# Patient Record
Sex: MALE | Race: WHITE | NOT HISPANIC OR LATINO | Employment: FULL TIME | URBAN - METROPOLITAN AREA
[De-identification: names, ages, dates, MRNs, and addresses within clinical notes are randomized per-mention and may not be internally consistent; named-entity substitution may affect disease eponyms.]

---

## 2020-06-27 ENCOUNTER — APPOINTMENT (OUTPATIENT)
Dept: RADIOLOGY | Facility: MEDICAL CENTER | Age: 50
End: 2020-06-27
Attending: EMERGENCY MEDICINE
Payer: COMMERCIAL

## 2020-06-27 ENCOUNTER — HOSPITAL ENCOUNTER (EMERGENCY)
Facility: MEDICAL CENTER | Age: 50
End: 2020-06-28
Attending: EMERGENCY MEDICINE
Payer: COMMERCIAL
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
## 2020-06-27 DIAGNOSIS — S32.020A CLOSED COMPRESSION FRACTURE OF L2 LUMBAR VERTEBRA, INITIAL ENCOUNTER (HCC): ICD-10-CM

## 2020-06-27 DIAGNOSIS — S22.42XA CLOSED FRACTURE OF MULTIPLE RIBS OF LEFT SIDE, INITIAL ENCOUNTER: ICD-10-CM

## 2020-06-27 DIAGNOSIS — F10.920 ALCOHOLIC INTOXICATION WITHOUT COMPLICATION (HCC): ICD-10-CM

## 2020-06-27 PROCEDURE — 70450 CT HEAD/BRAIN W/O DYE: CPT

## 2020-06-27 PROCEDURE — 72125 CT NECK SPINE W/O DYE: CPT

## 2020-06-27 PROCEDURE — 71045 X-RAY EXAM CHEST 1 VIEW: CPT

## 2020-06-27 PROCEDURE — 72131 CT LUMBAR SPINE W/O DYE: CPT

## 2020-06-27 PROCEDURE — 99285 EMERGENCY DEPT VISIT HI MDM: CPT

## 2020-06-27 PROCEDURE — 72128 CT CHEST SPINE W/O DYE: CPT

## 2020-06-27 ASSESSMENT — ENCOUNTER SYMPTOMS
SEIZURES: 0
LOSS OF CONSCIOUSNESS: 1
BLURRED VISION: 0
SORE THROAT: 0
SHORTNESS OF BREATH: 0
COUGH: 0
FOCAL WEAKNESS: 0
ABDOMINAL PAIN: 0
FALLS: 1
NECK PAIN: 0
CHILLS: 0
HEADACHES: 1
FEVER: 0
VOMITING: 0
EYE REDNESS: 0
BACK PAIN: 1

## 2020-06-27 ASSESSMENT — LIFESTYLE VARIABLES: SUBSTANCE_ABUSE: 1

## 2020-06-28 VITALS
BODY MASS INDEX: 28.79 KG/M2 | WEIGHT: 190 LBS | OXYGEN SATURATION: 93 % | TEMPERATURE: 98 F | HEART RATE: 90 BPM | SYSTOLIC BLOOD PRESSURE: 120 MMHG | RESPIRATION RATE: 20 BRPM | DIASTOLIC BLOOD PRESSURE: 73 MMHG | HEIGHT: 68 IN

## 2020-06-28 PROCEDURE — 700111 HCHG RX REV CODE 636 W/ 250 OVERRIDE (IP): Performed by: EMERGENCY MEDICINE

## 2020-06-28 PROCEDURE — 96374 THER/PROPH/DIAG INJ IV PUSH: CPT

## 2020-06-28 PROCEDURE — 700102 HCHG RX REV CODE 250 W/ 637 OVERRIDE(OP): Performed by: EMERGENCY MEDICINE

## 2020-06-28 PROCEDURE — A9270 NON-COVERED ITEM OR SERVICE: HCPCS | Performed by: EMERGENCY MEDICINE

## 2020-06-28 RX ORDER — HYDROCODONE BITARTRATE AND ACETAMINOPHEN 5; 325 MG/1; MG/1
1 TABLET ORAL ONCE
Status: COMPLETED | OUTPATIENT
Start: 2020-06-28 | End: 2020-06-28

## 2020-06-28 RX ORDER — MORPHINE SULFATE 10 MG/ML
6 INJECTION, SOLUTION INTRAMUSCULAR; INTRAVENOUS ONCE
Status: COMPLETED | OUTPATIENT
Start: 2020-06-28 | End: 2020-06-28

## 2020-06-28 RX ORDER — HYDROCODONE BITARTRATE AND ACETAMINOPHEN 5; 325 MG/1; MG/1
1 TABLET ORAL EVERY 4 HOURS PRN
Qty: 15 TAB | Refills: 0 | Status: SHIPPED | OUTPATIENT
Start: 2020-06-28 | End: 2020-07-03

## 2020-06-28 RX ADMIN — HYDROCODONE BITARTRATE AND ACETAMINOPHEN 1 TABLET: 5; 325 TABLET ORAL at 04:40

## 2020-06-28 RX ADMIN — MORPHINE SULFATE 6 MG: 10 INJECTION INTRAVENOUS at 01:20

## 2020-06-28 NOTE — ED NOTES
MD at bedside prior to discharge. Pt given discharge instructions and verbalized understanding with prescription stapled to discharge paperwork, all questions are answered, signed copy in chart. Narcotic consent signed and informed not to drive while on narcotics. PIV removed and dressed. Pt ambulatory to lobby, gait steady, discharged to family. Pt took all belongings from room.

## 2020-06-28 NOTE — ED NOTES
Brace placed on pt without incidence. Pt is educated how to use brace by traction tech. Pt verbalizes understanding.     Pt ambulates to and from bathroom with shuffled, steady gait. He reports the morphine helped a lot, but is still in pain when he moves. Pt gets back into his bed with discomfort.
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
## 2020-11-13 ENCOUNTER — HOSPITAL ENCOUNTER (OUTPATIENT)
Facility: MEDICAL CENTER | Age: 50
End: 2020-11-13
Attending: PHYSICIAN ASSISTANT
Payer: COMMERCIAL

## 2020-11-13 ENCOUNTER — OFFICE VISIT (OUTPATIENT)
Dept: URGENT CARE | Facility: PHYSICIAN GROUP | Age: 50
End: 2020-11-13
Payer: COMMERCIAL

## 2020-11-13 VITALS
OXYGEN SATURATION: 97 % | TEMPERATURE: 99.6 F | SYSTOLIC BLOOD PRESSURE: 122 MMHG | HEIGHT: 68 IN | BODY MASS INDEX: 27.28 KG/M2 | DIASTOLIC BLOOD PRESSURE: 82 MMHG | HEART RATE: 87 BPM | WEIGHT: 180 LBS | RESPIRATION RATE: 18 BRPM

## 2020-11-13 DIAGNOSIS — R50.9 FEVER, UNSPECIFIED FEVER CAUSE: ICD-10-CM

## 2020-11-13 LAB
FLUAV+FLUBV AG SPEC QL IA: NEGATIVE
INT CON NEG: NEGATIVE
INT CON POS: POSITIVE

## 2020-11-13 PROCEDURE — U0003 INFECTIOUS AGENT DETECTION BY NUCLEIC ACID (DNA OR RNA); SEVERE ACUTE RESPIRATORY SYNDROME CORONAVIRUS 2 (SARS-COV-2) (CORONAVIRUS DISEASE [COVID-19]), AMPLIFIED PROBE TECHNIQUE, MAKING USE OF HIGH THROUGHPUT TECHNOLOGIES AS DESCRIBED BY CMS-2020-01-R: HCPCS

## 2020-11-13 PROCEDURE — 99203 OFFICE O/P NEW LOW 30 MIN: CPT | Performed by: PHYSICIAN ASSISTANT

## 2020-11-13 PROCEDURE — 87804 INFLUENZA ASSAY W/OPTIC: CPT | Performed by: PHYSICIAN ASSISTANT

## 2020-11-13 ASSESSMENT — ENCOUNTER SYMPTOMS
PALPITATIONS: 0
COUGH: 1
SHORTNESS OF BREATH: 0
WHEEZING: 0
MYALGIAS: 1
FEVER: 1
HEMOPTYSIS: 0
SPUTUM PRODUCTION: 1
CHILLS: 1
SORE THROAT: 1

## 2020-11-13 NOTE — PROGRESS NOTES
"Subjective:   Thomas Drake is a 50 y.o. male who presents for Cough (fever 102.1 and cough started yesterday )      Cough  This is a new problem. The current episode started in the past 7 days. The problem has been unchanged. The problem occurs constantly. The cough is non-productive. Associated symptoms include chills, a fever, myalgias and a sore throat. Pertinent negatives include no chest pain, ear pain, hemoptysis, shortness of breath or wheezing. The treatment provided no relief.       Review of Systems   Constitutional: Positive for chills, fever and malaise/fatigue.   HENT: Positive for congestion and sore throat. Negative for ear pain.    Respiratory: Positive for cough and sputum production. Negative for hemoptysis, shortness of breath and wheezing.    Cardiovascular: Negative for chest pain and palpitations.   Musculoskeletal: Positive for myalgias.   All other systems reviewed and are negative.      Medications:    • This patient does not have an active medication from one of the medication groupers.    Allergies: Amoxicillin    Problem List: Thomas Drake does not have a problem list on file.    Surgical History:  No past surgical history on file.    Past Social Hx: Thomas Drake  reports that he has never smoked. His smokeless tobacco use includes chew. He reports current alcohol use. He reports current drug use. Drug: Inhaled.     Past Family Hx:  Thomas Drake family history is not on file.     Problem list, medications, and allergies reviewed by myself today in Epic.     Objective:     Blood Pressure 122/82   Pulse 87   Temperature 37.6 °C (99.6 °F) (Temporal)   Respiration 18   Height 1.727 m (5' 8\")   Weight 81.6 kg (180 lb)   Oxygen Saturation 97%   Body Mass Index 27.37 kg/m²     Physical Exam  Vitals signs reviewed.   Constitutional:       General: He is not in acute distress.     Appearance: He is well-developed. He is not ill-appearing or toxic-appearing.      Interventions: He is not " intubated.  HENT:      Head: Normocephalic and atraumatic.      Right Ear: Hearing, tympanic membrane, ear canal and external ear normal.      Left Ear: Hearing, tympanic membrane, ear canal and external ear normal.      Nose: Nose normal.      Mouth/Throat:      Pharynx: Uvula midline.   Eyes:      General: Lids are normal.      Conjunctiva/sclera: Conjunctivae normal.   Neck:      Musculoskeletal: Full passive range of motion without pain, normal range of motion and neck supple.   Cardiovascular:      Rate and Rhythm: Regular rhythm.      Heart sounds: Normal heart sounds, S1 normal and S2 normal. No murmur. No friction rub. No gallop.    Pulmonary:      Effort: Pulmonary effort is normal. No tachypnea, bradypnea, accessory muscle usage or respiratory distress. He is not intubated.      Breath sounds: Normal breath sounds. No decreased breath sounds, wheezing, rhonchi or rales.   Chest:      Chest wall: No tenderness.   Musculoskeletal: Normal range of motion.   Skin:     General: Skin is warm and dry.   Neurological:      Mental Status: He is alert and oriented to person, place, and time.   Psychiatric:         Speech: Speech normal.         Behavior: Behavior normal.         Thought Content: Thought content normal.         Judgment: Judgment normal.         Assessment/Plan:     Medical Decision Making/Comments     Pt is a 50 yr old male who presents for evaluation of possible Covid-19 infection.  Pt states possible exposure. Complains of fever, fatigue and bodyaches.  Vitals and exam unremarkable.     Diagnosis and associated orders     1. Fever, unspecified fever cause  COVID/SARS COV-2 PCR    POCT Influenza A/B     - isolate for 24-72 hrs while pcr test is pending  - treat symptoms with OTC medications           Differential diagnosis, natural history, supportive care, and indications for immediate follow-up discussed.    Advised the patient to follow-up with the primary care physician for recheck,  reevaluation, and consideration of further management.    Please note that this dictation was created using voice recognition software. I have made a reasonable attempt to correct obvious errors, but I expect that there are errors of grammar and possibly content that I did not discover before finalizing the note.

## 2020-11-14 DIAGNOSIS — R50.9 FEVER, UNSPECIFIED FEVER CAUSE: ICD-10-CM

## 2020-11-14 LAB
COVID ORDER STATUS COVID19: NORMAL
SARS-COV-2 RNA RESP QL NAA+PROBE: DETECTED
SPECIMEN SOURCE: ABNORMAL

## 2020-11-15 ENCOUNTER — TELEPHONE (OUTPATIENT)
Dept: URGENT CARE | Facility: PHYSICIAN GROUP | Age: 50
End: 2020-11-15

## 2021-08-24 ENCOUNTER — TELEPHONE (OUTPATIENT)
Dept: SCHEDULING | Facility: IMAGING CENTER | Age: 51
End: 2021-08-24

## 2021-08-27 ENCOUNTER — HOSPITAL ENCOUNTER (OUTPATIENT)
Dept: LAB | Facility: MEDICAL CENTER | Age: 51
End: 2021-08-27
Attending: NURSE PRACTITIONER
Payer: COMMERCIAL

## 2021-08-27 ENCOUNTER — TELEPHONE (OUTPATIENT)
Dept: MEDICAL GROUP | Facility: PHYSICIAN GROUP | Age: 51
End: 2021-08-27

## 2021-08-27 ENCOUNTER — PATIENT MESSAGE (OUTPATIENT)
Dept: MEDICAL GROUP | Facility: PHYSICIAN GROUP | Age: 51
End: 2021-08-27

## 2021-08-27 ENCOUNTER — OFFICE VISIT (OUTPATIENT)
Dept: MEDICAL GROUP | Facility: PHYSICIAN GROUP | Age: 51
End: 2021-08-27

## 2021-08-27 VITALS
WEIGHT: 166 LBS | HEIGHT: 68 IN | DIASTOLIC BLOOD PRESSURE: 86 MMHG | HEART RATE: 71 BPM | TEMPERATURE: 99 F | SYSTOLIC BLOOD PRESSURE: 124 MMHG | BODY MASS INDEX: 25.16 KG/M2 | RESPIRATION RATE: 12 BRPM | OXYGEN SATURATION: 97 %

## 2021-08-27 DIAGNOSIS — E11.69 DM TYPE 2 WITH DIABETIC MIXED HYPERLIPIDEMIA (HCC): ICD-10-CM

## 2021-08-27 DIAGNOSIS — Z76.89 ENCOUNTER TO ESTABLISH CARE: ICD-10-CM

## 2021-08-27 DIAGNOSIS — E78.2 DM TYPE 2 WITH DIABETIC MIXED HYPERLIPIDEMIA (HCC): ICD-10-CM

## 2021-08-27 DIAGNOSIS — J30.2 SEASONAL ALLERGIES: ICD-10-CM

## 2021-08-27 DIAGNOSIS — Z72.0 TOBACCO USE: ICD-10-CM

## 2021-08-27 DIAGNOSIS — E78.2 MIXED HYPERLIPIDEMIA: ICD-10-CM

## 2021-08-27 LAB
25(OH)D3 SERPL-MCNC: 28 NG/ML (ref 30–100)
ALBUMIN SERPL BCP-MCNC: 4.5 G/DL (ref 3.2–4.9)
ALBUMIN/GLOB SERPL: 1.9 G/DL
ALP SERPL-CCNC: 119 U/L (ref 30–99)
ALT SERPL-CCNC: 26 U/L (ref 2–50)
ANION GAP SERPL CALC-SCNC: 10 MMOL/L (ref 7–16)
AST SERPL-CCNC: 13 U/L (ref 12–45)
BILIRUB SERPL-MCNC: 0.3 MG/DL (ref 0.1–1.5)
BUN SERPL-MCNC: 15 MG/DL (ref 8–22)
CALCIUM SERPL-MCNC: 9.7 MG/DL (ref 8.5–10.5)
CHLORIDE SERPL-SCNC: 100 MMOL/L (ref 96–112)
CHOLEST SERPL-MCNC: 185 MG/DL (ref 100–199)
CO2 SERPL-SCNC: 27 MMOL/L (ref 20–33)
CREAT SERPL-MCNC: 0.71 MG/DL (ref 0.5–1.4)
CREAT UR-MCNC: 41.84 MG/DL
ERYTHROCYTE [DISTWIDTH] IN BLOOD BY AUTOMATED COUNT: 35.4 FL (ref 35.9–50)
EST. AVERAGE GLUCOSE BLD GHB EST-MCNC: 321 MG/DL
FASTING STATUS PATIENT QL REPORTED: NORMAL
GLOBULIN SER CALC-MCNC: 2.4 G/DL (ref 1.9–3.5)
GLUCOSE SERPL-MCNC: 325 MG/DL (ref 65–99)
HBA1C MFR BLD: 12.8 % (ref 4–5.6)
HCT VFR BLD AUTO: 48.4 % (ref 42–52)
HDLC SERPL-MCNC: 47 MG/DL
HGB BLD-MCNC: 16.4 G/DL (ref 14–18)
LDLC SERPL CALC-MCNC: 108 MG/DL
MCH RBC QN AUTO: 27.2 PG (ref 27–33)
MCHC RBC AUTO-ENTMCNC: 33.9 G/DL (ref 33.7–35.3)
MCV RBC AUTO: 80.1 FL (ref 81.4–97.8)
MICROALBUMIN UR-MCNC: <1.2 MG/DL
MICROALBUMIN/CREAT UR: NORMAL MG/G (ref 0–30)
PLATELET # BLD AUTO: 321 K/UL (ref 164–446)
PMV BLD AUTO: 9.1 FL (ref 9–12.9)
POTASSIUM SERPL-SCNC: 4.3 MMOL/L (ref 3.6–5.5)
PROT SERPL-MCNC: 6.9 G/DL (ref 6–8.2)
RBC # BLD AUTO: 6.04 M/UL (ref 4.7–6.1)
SODIUM SERPL-SCNC: 137 MMOL/L (ref 135–145)
TRIGL SERPL-MCNC: 152 MG/DL (ref 0–149)
WBC # BLD AUTO: 7.6 K/UL (ref 4.8–10.8)

## 2021-08-27 PROCEDURE — 80053 COMPREHEN METABOLIC PANEL: CPT

## 2021-08-27 PROCEDURE — 80061 LIPID PANEL: CPT

## 2021-08-27 PROCEDURE — 82306 VITAMIN D 25 HYDROXY: CPT

## 2021-08-27 PROCEDURE — 85027 COMPLETE CBC AUTOMATED: CPT

## 2021-08-27 PROCEDURE — 36415 COLL VENOUS BLD VENIPUNCTURE: CPT

## 2021-08-27 PROCEDURE — 82570 ASSAY OF URINE CREATININE: CPT

## 2021-08-27 PROCEDURE — 82043 UR ALBUMIN QUANTITATIVE: CPT

## 2021-08-27 PROCEDURE — 99214 OFFICE O/P EST MOD 30 MIN: CPT | Performed by: NURSE PRACTITIONER

## 2021-08-27 PROCEDURE — 83036 HEMOGLOBIN GLYCOSYLATED A1C: CPT

## 2021-08-27 RX ORDER — MONTELUKAST SODIUM 10 MG/1
10 TABLET ORAL DAILY
Qty: 90 TABLET | Refills: 1 | Status: SHIPPED | OUTPATIENT
Start: 2021-08-27

## 2021-08-27 RX ORDER — ALBUTEROL SULFATE 90 UG/1
2 AEROSOL, METERED RESPIRATORY (INHALATION) EVERY 4 HOURS PRN
Qty: 6.7 EACH | Refills: 2 | Status: SHIPPED | OUTPATIENT
Start: 2021-08-27

## 2021-08-27 RX ORDER — LANCETS 30 GAUGE
EACH MISCELLANEOUS
Qty: 100 EACH | Refills: 3 | Status: SHIPPED | OUTPATIENT
Start: 2021-08-27

## 2021-08-27 RX ORDER — GLUCOSAMINE HCL/CHONDROITIN SU 500-400 MG
CAPSULE ORAL
Qty: 100 EACH | Refills: 3 | Status: SHIPPED | OUTPATIENT
Start: 2021-08-27

## 2021-08-27 RX ORDER — FLUTICASONE PROPIONATE 50 MCG
1 SPRAY, SUSPENSION (ML) NASAL DAILY
COMMUNITY

## 2021-08-27 RX ORDER — INSULIN GLARGINE 100 [IU]/ML
15 INJECTION, SOLUTION SUBCUTANEOUS EVERY EVENING
Qty: 5 ML | Refills: 2 | Status: SHIPPED | OUTPATIENT
Start: 2021-08-27 | End: 2021-09-07

## 2021-08-27 ASSESSMENT — PATIENT HEALTH QUESTIONNAIRE - PHQ9: CLINICAL INTERPRETATION OF PHQ2 SCORE: 0

## 2021-08-27 NOTE — ASSESSMENT & PLAN NOTE
This is a chronic condition, not controlled.  He has tried multiple OTC antihistamines without relief.  He does continue use fluticasone spray with claritin currently with minimal relief of his allergies.  He does endorse headaches, scratchy throat, nasal congestion/discharge, itchy/watery eyes.  He does endorse allergies to cat dander.   > Will trial montelukast.

## 2021-08-27 NOTE — TELEPHONE ENCOUNTER
----- Message from CHRISTOPHER Stein sent at 8/27/2021  2:57 PM PDT -----  Hi,     Please call patient and let him know his A1c is very high at 12.8% with fasting glucose at 325.  His triglyceride levels are high as well due to his uncontrolled diabetes.  I have sent to his pharmacy metformin, jardiance, and lantus.  He will take lantus 15 units nightly; metformin twice a day; and jardiance once a day.  His vitamin D level is also a little low - he can take 2000 units of vitamin D supplement daily.      Please schedule him an appointment in 4 weeks for medication tolerance.      Thank you!  Mickie

## 2021-08-27 NOTE — ASSESSMENT & PLAN NOTE
This is a chronic condition, not controlled.  He was previously on treatment, including metformin 2000 mg BID, glyburide 2.5 mg BID, and insulin lantus.  He is due for updated labs.  He denies polyuria, polydipsia, weight loss, numbness or tingling, and blurred vision.    > Labs returned from today:  A1c 12.8% with fasting glucose of 325.  Lipid profile shows elevations in triglyceride levels.    > Start metformin, jardiance, and long acting insulin.  Continue to monitor blood sugars at home, and he will need to bring in his sugar log at time of visit.

## 2021-08-27 NOTE — PROGRESS NOTES
Subjective  Chief Complaint  Establish care to manage his chronic conditions    History of Present Illness  Thomas Drake is a 50 y.o. male. This patient is here today to establish care.  His prior PCP was Maicol Cid.  Patient Active Problem List    Diagnosis Date Noted   • Seasonal allergies 08/27/2021   • Tobacco use 08/27/2021   • DM type 2 with diabetic mixed hyperlipidemia (HCC) 01/28/2010     Past Medical History    Allergies: Amoxicillin, Paroxetine, and Atorvastatin calcium  Past Medical History:   Diagnosis Date   • Asthma    • Diabetes (HCC)     type 2   • Essential hypertension, benign 9/22/2008   • Extrinsic asthma 9/30/2008   • Hyperlipidemia    • Hypertension      Past Surgical History:   Procedure Laterality Date   • OTHER ORTHOPEDIC SURGERY      L4-L5 lumbar discectomy   • OTHER ORTHOPEDIC SURGERY Left     foot repair without hardware placement     Current Outpatient Medications Ordered in Epic   Medication Sig Dispense Refill   • fluticasone (FLONASE) 50 MCG/ACT nasal spray Administer 1 Spray into affected nostril(S) every day.     • Loratadine (CLARITIN PO) Take  by mouth.     • montelukast (SINGULAIR) 10 MG Tab Take 1 Tablet by mouth every day. 90 Tablet 1   • albuterol 108 (90 Base) MCG/ACT Aero Soln inhalation aerosol Inhale 2 Puffs every four hours as needed for Shortness of Breath. 6.7 Each 2   • metformin (GLUCOPHAGE) 1000 MG tablet Take 1 Tablet by mouth 2 times a day with meals. 180 Tablet 0   • Empagliflozin 25 MG Tab Take 25 mg by mouth every day. 90 Tablet 0   • insulin glargine (INSULIN GLARGINE) 100 UNIT/ML Solution Pen-injector injection Inject 15 Units under the skin every evening. 5 mL 2     No current Epic-ordered facility-administered medications on file.     Family History:    Family History   Problem Relation Age of Onset   • Diabetes Father         type 1   • Hypertension Father    • Hyperlipidemia Father    • Diabetes Brother         type 1   • Hypertension Brother    •  Hyperlipidemia Brother    • Diabetes Paternal Grandmother         type 1   • Heart Disease Paternal Grandmother    • Hypertension Paternal Grandmother    • Hyperlipidemia Paternal Grandmother    • Cancer Neg Hx    • Stroke Neg Hx       Personal/Social History:    Social History     Tobacco Use   • Smoking status: Former Smoker     Packs/day: 1.00     Years: 30.00     Pack years: 30.00     Types: Cigarettes     Quit date: 8/27/2011     Years since quitting: 10.0   • Smokeless tobacco: Former User     Types: Chew     Quit date: 8/13/2021   • Tobacco comment: 8 years without nicotine and resumed 1/2020   Vaping Use   • Vaping Use: Some days   • Substances: Nicotine   Substance Use Topics   • Alcohol use: Yes     Comment: occ   • Drug use: Yes     Frequency: 7.0 times per week     Types: Inhaled     Comment: marijuana     Social History     Social History Narrative   • Not on file      Current Outpatient Medications   Medication Sig Dispense Refill   • fluticasone (FLONASE) 50 MCG/ACT nasal spray Administer 1 Spray into affected nostril(S) every day.     • Loratadine (CLARITIN PO) Take  by mouth.     • montelukast (SINGULAIR) 10 MG Tab Take 1 Tablet by mouth every day. 90 Tablet 1   • albuterol 108 (90 Base) MCG/ACT Aero Soln inhalation aerosol Inhale 2 Puffs every four hours as needed for Shortness of Breath. 6.7 Each 2   • metformin (GLUCOPHAGE) 1000 MG tablet Take 1 Tablet by mouth 2 times a day with meals. 180 Tablet 0   • Empagliflozin 25 MG Tab Take 25 mg by mouth every day. 90 Tablet 0   • insulin glargine (INSULIN GLARGINE) 100 UNIT/ML Solution Pen-injector injection Inject 15 Units under the skin every evening. 5 mL 2     No current facility-administered medications for this visit.     Review of Systems  General: Negative for fever/chills   Eyes:  Negative for vision changes.  ENT:  Negative for hearing changes.   Respiratory:  Negative for cough and dyspnea.    Cardiovascular:  Negative for chest pain and  "palpitations.  Gastrointestinal:  Negative for nausea/vomiting.   Genitourinary:  Negative for dysuria.   Musculoskeletal:  Negative for myalgias.   Skin:  Negative for rash.   Neurological:  Negative for numbness/tingling.   Heme/Lymph:  Does not bruise/bleed easily.    Objective  Physical Exam  /86 (BP Location: Right arm, Patient Position: Sitting, BP Cuff Size: Adult)   Pulse 71   Temp 37.2 °C (99 °F) (Temporal)   Resp 12   Ht 1.727 m (5' 8\")   Wt 75.3 kg (166 lb)   SpO2 97%  Body mass index is 25.24 kg/m².  General:  Alert and oriented.  Well appearing.  NAD.  Head:  Normocephalic.   Eyes:  Eyes conjunctiva clear lids without ptosis.    ENT: Ears normal shape and contour.   Neck: Supple without JVD.  Pulmonary:  Normal effort.  Clear to ausculation without rales, ronchi, or wheezing.  Cardiovascular:  Regular rate and rhythm without murmur, rubs or gallop.  Radial pulses are intact and equal bilaterally.  Gastrointestinal: Abdomen soft, nontender, nondistended. Normal bowel sounds. Liver and spleen are not palpable.  Musculoskeletal:  No extremity cyanosis, clubbing, or edema.  Skin:  Warm and dry.  No obvious lesions.  Neurologic: Grossly intact.  Sensation intact.   Psych: Normal mood and affect. Alert and oriented x3. Judgment and insight is normal.    Assessment/Plan   50 y.o. male presenting with the following.     1. Encounter to establish care     2. Seasonal allergies  montelukast (SINGULAIR) 10 MG Tab    albuterol 108 (90 Base) MCG/ACT Aero Soln inhalation aerosol    VITAMIN D,25 HYDROXY    CBC WITHOUT DIFFERENTIAL   3. DM type 2 with diabetic mixed hyperlipidemia (HCC)  HEMOGLOBIN A1C    Comp Metabolic Panel    Lipid Profile    CBC WITHOUT DIFFERENTIAL    MICROALBUMIN, URINE    metformin (GLUCOPHAGE) 1000 MG tablet    Empagliflozin 25 MG Tab    insulin glargine (INSULIN GLARGINE) 100 UNIT/ML Solution Pen-injector injection   4. Tobacco use  Comp Metabolic Panel    Lipid Profile    CBC " WITHOUT DIFFERENTIAL     Seasonal allergies  This is a chronic condition, not controlled.  He has tried multiple OTC antihistamines without relief.  He does continue use fluticasone spray with claritin currently with minimal relief of his allergies.  He does endorse headaches, scratchy throat, nasal congestion/discharge, itchy/watery eyes.  He does endorse allergies to cat dander.   > Will trial montelukast.     DM type 2 with diabetic mixed hyperlipidemia (HCC)  This is a chronic condition, stable.  He was previously on treatment, including metformin 2000 mg BID, glyburide 2.5 mg BID, and insulin lantus.  He is due for updated labs.  He denies polyuria, polydipsia, weight loss, numbness or tingling, and blurred vision.    > Labs returned from today:  A1c 12.8% with fasting glucose of 325.  Lipid profile shows elevations in triglyceride levels.    > Start metformin, jardiance, and long acting insulin.  Continue to monitor blood sugars at home, and he will need to bring in his sugar log at time of visit.      Tobacco use  This is a chronic condition, not controlled.  He reports he quit nicotine for 8 years; however, resumed nicotine use when he moved to [place] 1/2020.  He reports that he is no longer smoking cigarettes, but he is using vape pens with nicotine.  Previously smoked 1-4 packs of cigarettes for 30 years; 1 can of chewing tobacco daily.   > Smoking cessation counseling provided.     Return in about 6 months (around 2/27/2022), or if symptoms worsen or fail to improve, for allergies.    Health Maintenance: Completed    I have placed the below orders and discussed them with an approved delegating provider.  The MA is performing the below orders under the direction of Dr. Jacobs.    Please note that this dictation was created using voice recognition software. I have worked with consultants from the vendor as well as technical experts from [facility] to optimize the interface. I have made every reasonable  attempt to correct obvious errors, but I expect that there are errors of grammar and possibly content that I did not discover before finalizing the note.    KATJA Stein  [facility]

## 2021-09-07 RX ORDER — INSULIN GLARGINE 100 [IU]/ML
15 INJECTION, SOLUTION SUBCUTANEOUS EVERY EVENING
Qty: 5 ML | Refills: 2 | Status: SHIPPED | OUTPATIENT
Start: 2021-09-07 | End: 2021-09-30 | Stop reason: SDUPTHER

## 2021-09-30 DIAGNOSIS — E78.2 DM TYPE 2 WITH DIABETIC MIXED HYPERLIPIDEMIA (HCC): ICD-10-CM

## 2021-09-30 DIAGNOSIS — E11.69 DM TYPE 2 WITH DIABETIC MIXED HYPERLIPIDEMIA (HCC): ICD-10-CM

## 2021-09-30 RX ORDER — INSULIN GLARGINE 100 [IU]/ML
15 INJECTION, SOLUTION SUBCUTANEOUS EVERY EVENING
Qty: 5 ML | Refills: 2 | Status: SHIPPED | OUTPATIENT
Start: 2021-09-30

## 2021-10-26 ENCOUNTER — OFFICE VISIT (OUTPATIENT)
Dept: MEDICAL GROUP | Facility: PHYSICIAN GROUP | Age: 51
End: 2021-10-26
Payer: COMMERCIAL

## 2021-10-26 VITALS
BODY MASS INDEX: 26.22 KG/M2 | WEIGHT: 173 LBS | TEMPERATURE: 99 F | OXYGEN SATURATION: 97 % | DIASTOLIC BLOOD PRESSURE: 88 MMHG | HEART RATE: 89 BPM | RESPIRATION RATE: 12 BRPM | SYSTOLIC BLOOD PRESSURE: 142 MMHG | HEIGHT: 68 IN

## 2021-10-26 DIAGNOSIS — E11.69 DM TYPE 2 WITH DIABETIC MIXED HYPERLIPIDEMIA (HCC): ICD-10-CM

## 2021-10-26 DIAGNOSIS — Z72.0 TOBACCO USE: ICD-10-CM

## 2021-10-26 DIAGNOSIS — E78.2 DM TYPE 2 WITH DIABETIC MIXED HYPERLIPIDEMIA (HCC): ICD-10-CM

## 2021-10-26 PROCEDURE — 99214 OFFICE O/P EST MOD 30 MIN: CPT | Performed by: NURSE PRACTITIONER

## 2021-10-26 RX ORDER — BLOOD SUGAR DIAGNOSTIC
STRIP MISCELLANEOUS
COMMUNITY
Start: 2021-09-30

## 2021-10-26 ASSESSMENT — FIBROSIS 4 INDEX: FIB4 SCORE: 0.39

## 2021-10-26 NOTE — PROGRESS NOTES
Subjective  Chief Complaint  Chief Complaint   Patient presents with   • Follow-Up     History of Present Illness  Thomas presents today with the following.  Problem   Tobacco Use   DM Type 2 With Diabetic Mixed Hyperlipidemia (Hcc)     Past Medical History    Allergies: Amoxicillin, Paroxetine, and Atorvastatin calcium  Past Medical History:   Diagnosis Date   • Asthma    • Diabetes (HCC)     type 2   • Essential hypertension, benign 9/22/2008   • Extrinsic asthma 9/30/2008   • Hyperlipidemia    • Hypertension      Current Outpatient Medications Ordered in Epic   Medication Sig Dispense Refill   • ONETOUCH ULTRA strip      • Empagliflozin 25 MG Tab Take 25 mg by mouth every day. 90 Tablet 1   • insulin glargine (LANTUS SOLOSTAR) 100 UNIT/ML Solution Pen-injector injection Inject 15 Units under the skin every evening. 5 mL 2   • fluticasone (FLONASE) 50 MCG/ACT nasal spray Administer 1 Spray into affected nostril(S) every day.     • Loratadine (CLARITIN PO) Take  by mouth.     • montelukast (SINGULAIR) 10 MG Tab Take 1 Tablet by mouth every day. 90 Tablet 1   • albuterol 108 (90 Base) MCG/ACT Aero Soln inhalation aerosol Inhale 2 Puffs every four hours as needed for Shortness of Breath. 6.7 Each 2   • metformin (GLUCOPHAGE) 1000 MG tablet Take 1 Tablet by mouth 2 times a day with meals. 180 Tablet 0   • Blood Glucose Meter Kit Test blood sugar as recommended by provider. Insurance preferred blood glucose monitoring kit. 1 Kit 0   • Blood Glucose Test Strips Use one insurance preferred strip to test blood sugar three times daily before meals. 100 Strip 3   • Lancets Use one insurance preferred lancet to test blood sugar three times daily before meals. 100 Each 3   • Alcohol Swabs Wipe site with prep pad prior to injection. 100 Each 3   • Insulin Pen Needle 32 G x 4 mm Use one pen needle in pen device to inject insulin once daily. 100 Each 3     No current Epic-ordered facility-administered medications on file.  "    Review of Systems  See below.     Objective  Physical Exam  /88 (BP Location: Left arm, Patient Position: Sitting, BP Cuff Size: Adult)   Pulse 89   Temp 37.2 °C (99 °F) (Temporal)   Resp 12   Ht 1.727 m (5' 8\")   Wt 78.5 kg (173 lb)   SpO2 97%  Body mass index is 26.3 kg/m².  General:  Alert and oriented.  Well appearing.  NAD  Neck: Supple without JVD. No lymphadenopathy.  Pulmonary:  Normal effort.  Clear to ausculation without rales, ronchi, or wheezing.  Cardiovascular:  Regular rate and rhythm without murmur, rubs or gallop.   Skin:  Warm and dry.  No obvious lesions.  Diabetic foot exam: No lesions or calluses noted. 2+ pedal pulses. Sensation intact with 10 out of 10 on monofilament test.  Musculoskeletal:  No extremity cyanosis, clubbing, or edema.    Assessment/Plan  51 y.o. male with the following issues.    1. DM type 2 with diabetic mixed hyperlipidemia (HCC)  HEMOGLOBIN A1C    Basic Metabolic Panel    Empagliflozin 25 MG Tab    Diabetic Monofilament LE Exam    REFERRAL FOR RETINAL SCREENING EXAM   2. Tobacco use        Tobacco use  This is a chronic condition, not controlled.  He has stopped chewing tobacco since August 2021.  He has weaned from 18 mg nicotine to 3 mg of nicotine.  > Congratulated on continuing limiting nicotine use.  Smoking cessation counseling provided.     DM type 2 with diabetic mixed hyperlipidemia (HCC)  This is a chronic condition, not controlled.  Current medication:  lantus 15 units every evening, metformin 1000 mg BID with meals, jardiance 25 mg daily.  He reports tolerance to medications since resuming after our last visit in August.  He reports that home sugars dropped less than 200s; ranges from 160-170s.  He does endorse at times he does eat sugar foods, and notices his sugars jump into the 300s.  He denies any polyuria, polydipsia, weight loss, numbers/tingling, or blurred vision.    > Continue current treatment plan.  Recheck A1c and fasting glucose in " 4 weeks.  Reinforced importance of diet and lifestyle measures.      Return in about 4 months (around 3/3/2022), or if symptoms worsen or fail to improve, for already scheduled appointment.    Health Maintenance: Completed    I have placed the below orders and discussed them with an approved delegating provider.  The MA is performing the below orders under the direction of Dr. Jacobs.    Please note that this dictation was created using voice recognition software. I have worked with consultants from the vendor as well as technical experts from [facility] to optimize the interface. I have made every reasonable attempt to correct obvious errors, but I expect that there are errors of grammar and possibly content that I did not discover before finalizing the note.    KATJA Stein  [facility]

## 2021-10-26 NOTE — ASSESSMENT & PLAN NOTE
This is a chronic condition, not controlled.  He has stopped chewing tobacco since August 2021.  He has weaned from 18 mg nicotine to 3 mg of nicotine.  > Congratulated on continuing limiting nicotine use.  Smoking cessation counseling provided.

## 2021-10-26 NOTE — ASSESSMENT & PLAN NOTE
This is a chronic condition, not controlled.  Current medication:  lantus 15 units every evening, metformin 1000 mg BID with meals, jardiance 25 mg daily.  He reports tolerance to medications since resuming after our last visit in August.  He reports that home sugars dropped less than 200s; ranges from 160-170s.  He does endorse at times he does eat sugar foods, and notices his sugars jump into the 300s.  He denies any polyuria, polydipsia, weight loss, numbers/tingling, or blurred vision.    > Continue current treatment plan.  Recheck A1c and fasting glucose in 4 weeks.  Reinforced importance of diet and lifestyle measures.

## 2022-01-14 ENCOUNTER — HOSPITAL ENCOUNTER (OUTPATIENT)
Facility: MEDICAL CENTER | Age: 52
End: 2022-01-14
Attending: NURSE PRACTITIONER

## 2022-01-14 ENCOUNTER — OFFICE VISIT (OUTPATIENT)
Dept: URGENT CARE | Facility: PHYSICIAN GROUP | Age: 52
End: 2022-01-14

## 2022-01-14 VITALS
HEART RATE: 114 BPM | OXYGEN SATURATION: 95 % | TEMPERATURE: 99.7 F | SYSTOLIC BLOOD PRESSURE: 102 MMHG | DIASTOLIC BLOOD PRESSURE: 60 MMHG | BODY MASS INDEX: 25.76 KG/M2 | HEIGHT: 68 IN | RESPIRATION RATE: 16 BRPM | WEIGHT: 170 LBS

## 2022-01-14 DIAGNOSIS — R48.1 LOSS OF PERCEPTION FOR TASTE: ICD-10-CM

## 2022-01-14 DIAGNOSIS — Z86.39 H/O DIABETES MELLITUS: ICD-10-CM

## 2022-01-14 DIAGNOSIS — R53.83 OTHER FATIGUE: ICD-10-CM

## 2022-01-14 DIAGNOSIS — R11.2 NON-INTRACTABLE VOMITING WITH NAUSEA, UNSPECIFIED VOMITING TYPE: ICD-10-CM

## 2022-01-14 LAB
COVID ORDER STATUS COVID19: NORMAL
GLUCOSE BLD-MCNC: 319 MG/DL (ref 70–100)

## 2022-01-14 PROCEDURE — 99213 OFFICE O/P EST LOW 20 MIN: CPT | Performed by: NURSE PRACTITIONER

## 2022-01-14 PROCEDURE — U0005 INFEC AGEN DETEC AMPLI PROBE: HCPCS

## 2022-01-14 PROCEDURE — U0003 INFECTIOUS AGENT DETECTION BY NUCLEIC ACID (DNA OR RNA); SEVERE ACUTE RESPIRATORY SYNDROME CORONAVIRUS 2 (SARS-COV-2) (CORONAVIRUS DISEASE [COVID-19]), AMPLIFIED PROBE TECHNIQUE, MAKING USE OF HIGH THROUGHPUT TECHNOLOGIES AS DESCRIBED BY CMS-2020-01-R: HCPCS

## 2022-01-14 PROCEDURE — 82962 GLUCOSE BLOOD TEST: CPT | Performed by: NURSE PRACTITIONER

## 2022-01-14 ASSESSMENT — ENCOUNTER SYMPTOMS
CHILLS: 1
HEADACHES: 0
VOMITING: 1
PALPITATIONS: 0
NAUSEA: 0
FLANK PAIN: 0
WEAKNESS: 0
SHORTNESS OF BREATH: 0
ABDOMINAL PAIN: 0
DIARRHEA: 0
FEVER: 1
CONSTIPATION: 0
DIZZINESS: 0
MYALGIAS: 1
ORTHOPNEA: 0

## 2022-01-14 ASSESSMENT — FIBROSIS 4 INDEX: FIB4 SCORE: 0.39

## 2022-01-14 NOTE — PROGRESS NOTES
Subjective     Tobias Drake is a 51 y.o. male who presents with Emesis (Loss of taste and smell, vomitting, fatigue and fever x 1 days)            HPI  States loss of taste/smell, vomited last night, none today. History of Diabetes Mellitus Tyoe II. Had COVID infection 2020, not currently vaccinated. Fevers at home 101 last night. Taking Tylenol. Decreased appetite. Hydrating well., drinking water today. Taking blood sugars at home, last taken last night 319.     PMH:  has a past medical history of Asthma, Diabetes (HCC), Essential hypertension, benign (9/22/2008), Extrinsic asthma (9/30/2008), Hyperlipidemia, and Hypertension.  MEDS:   Current Outpatient Medications:   •  ONETOUCH ULTRA strip, , Disp: , Rfl:   •  Empagliflozin 25 MG Tab, Take 25 mg by mouth every day. (Patient not taking: Reported on 1/14/2022), Disp: 90 Tablet, Rfl: 1  •  insulin glargine (LANTUS SOLOSTAR) 100 UNIT/ML Solution Pen-injector injection, Inject 15 Units under the skin every evening. (Patient not taking: Reported on 1/14/2022), Disp: 5 mL, Rfl: 2  •  fluticasone (FLONASE) 50 MCG/ACT nasal spray, Administer 1 Spray into affected nostril(S) every day. (Patient not taking: Reported on 1/14/2022), Disp: , Rfl:   •  Loratadine (CLARITIN PO), Take  by mouth. (Patient not taking: Reported on 1/14/2022), Disp: , Rfl:   •  montelukast (SINGULAIR) 10 MG Tab, Take 1 Tablet by mouth every day. (Patient not taking: Reported on 1/14/2022), Disp: 90 Tablet, Rfl: 1  •  albuterol 108 (90 Base) MCG/ACT Aero Soln inhalation aerosol, Inhale 2 Puffs every four hours as needed for Shortness of Breath. (Patient not taking: Reported on 1/14/2022), Disp: 6.7 Each, Rfl: 2  •  metformin (GLUCOPHAGE) 1000 MG tablet, Take 1 Tablet by mouth 2 times a day with meals. (Patient not taking: Reported on 1/14/2022), Disp: 180 Tablet, Rfl: 0  •  Blood Glucose Meter Kit, Test blood sugar as recommended by provider. Insurance preferred blood glucose monitoring kit. (Patient not  taking: Reported on 1/14/2022), Disp: 1 Kit, Rfl: 0  •  Blood Glucose Test Strips, Use one insurance preferred strip to test blood sugar three times daily before meals. (Patient not taking: Reported on 1/14/2022), Disp: 100 Strip, Rfl: 3  •  Lancets, Use one insurance preferred lancet to test blood sugar three times daily before meals. (Patient not taking: Reported on 1/14/2022), Disp: 100 Each, Rfl: 3  •  Alcohol Swabs, Wipe site with prep pad prior to injection. (Patient not taking: Reported on 1/14/2022), Disp: 100 Each, Rfl: 3  •  Insulin Pen Needle 32 G x 4 mm, Use one pen needle in pen device to inject insulin once daily. (Patient not taking: Reported on 1/14/2022), Disp: 100 Each, Rfl: 3  ALLERGIES:   Allergies   Allergen Reactions   • Amoxicillin Swelling     Joint aches, swelling   • Paroxetine Unspecified     Non specific intolerance.   • Atorvastatin Calcium Unspecified     myalgias     SURGHX:   Past Surgical History:   Procedure Laterality Date   • OTHER ORTHOPEDIC SURGERY      L4-L5 lumbar discectomy   • OTHER ORTHOPEDIC SURGERY Left     foot repair without hardware placement     SOCHX:  reports that he quit smoking about 10 years ago. His smoking use included cigarettes. He has a 30.00 pack-year smoking history. He quit smokeless tobacco use about 5 months ago.  His smokeless tobacco use included chew. He reports current alcohol use. He reports current drug use. Frequency: 7.00 times per week. Drug: Inhaled.  FH: Family history was reviewed, no pertinent findings to report    Review of Systems   Constitutional: Positive for chills, fever and malaise/fatigue.   HENT: Negative.    Respiratory: Negative for shortness of breath.    Cardiovascular: Negative for chest pain, palpitations and orthopnea.   Gastrointestinal: Positive for vomiting. Negative for abdominal pain, constipation, diarrhea and nausea.   Genitourinary: Negative for dysuria, flank pain, frequency, hematuria and urgency.  "  Musculoskeletal: Positive for myalgias.   Neurological: Negative for dizziness, weakness and headaches.   All other systems reviewed and are negative.             Objective     /60   Pulse (!) 114   Temp 37.6 °C (99.7 °F) (Temporal)   Resp 16   Ht 1.727 m (5' 8\")   Wt 77.1 kg (170 lb)   SpO2 95%   BMI 25.85 kg/m²      Physical Exam  Vitals reviewed.   Constitutional:       General: He is awake. He is not in acute distress.     Appearance: He is well-developed. He is not ill-appearing, toxic-appearing or diaphoretic.      Comments: Appears aftigued.    HENT:      Head: Normocephalic.      Mouth/Throat:      Mouth: Mucous membranes are dry.   Eyes:      Conjunctiva/sclera: Conjunctivae normal.      Pupils: Pupils are equal, round, and reactive to light.   Cardiovascular:      Rate and Rhythm: Tachycardia present.   Pulmonary:      Effort: Pulmonary effort is normal. No tachypnea, bradypnea, accessory muscle usage or respiratory distress.      Breath sounds: Normal breath sounds and air entry.   Musculoskeletal:         General: Normal range of motion.      Cervical back: Normal range of motion and neck supple.   Skin:     General: Skin is warm and dry.   Neurological:      Mental Status: He is alert and oriented to person, place, and time.   Psychiatric:         Attention and Perception: Attention normal.         Mood and Affect: Mood normal.         Speech: Speech normal.         Behavior: Behavior normal. Behavior is cooperative.                             Assessment & Plan        1. Loss of perception for taste  *  - SARS-CoV-2 PCR (24 hour In-House): Collect NP swab in VTM; Future    2. Other fatigue    - SARS-CoV-2 PCR (24 hour In-House): Collect NP swab in VTM; Future    3. Non-intractable vomiting with nausea, unspecified vomiting type    - SARS-CoV-2 PCR (24 hour In-House): Collect NP swab in VTM; Future    4. H/O diabetes mellitus    - POCT Glucose: 310     -Stay home isolated from others " until COVID test resulted the follow CDC guidelines for positive cases as discussed  -Increase water intake  -May use over the counter Tylenol/Ibuprofen as needed for fever or body aches  -Get rest  -Salt water gargle as needed for any sore throat  -May use over the counter Flonase, saline nasal spray as needed for any nasal congestion  -May use over the counter cough suppressant medications like plain Robitussin/Delsym as needed   -Monitor for fevers, cough, shortness of breath, chest pain, chest tightness, lethargy- need re-evaluation  -MyChart release for result, may take up to 72 hrs for result, patient notified  Follow up with PCP regarding DM management

## 2022-01-15 LAB
SARS-COV-2 RNA RESP QL NAA+PROBE: NOTDETECTED
SPECIMEN SOURCE: NORMAL

## 2022-02-24 ENCOUNTER — HOSPITAL ENCOUNTER (OUTPATIENT)
Facility: MEDICAL CENTER | Age: 52
End: 2022-02-24
Attending: PHYSICIAN ASSISTANT
Payer: COMMERCIAL

## 2022-02-24 ENCOUNTER — OFFICE VISIT (OUTPATIENT)
Dept: URGENT CARE | Facility: PHYSICIAN GROUP | Age: 52
End: 2022-02-24
Payer: COMMERCIAL

## 2022-02-24 VITALS
OXYGEN SATURATION: 96 % | BODY MASS INDEX: 26.52 KG/M2 | HEIGHT: 68 IN | WEIGHT: 175 LBS | TEMPERATURE: 98.5 F | HEART RATE: 90 BPM | SYSTOLIC BLOOD PRESSURE: 130 MMHG | RESPIRATION RATE: 16 BRPM | DIASTOLIC BLOOD PRESSURE: 88 MMHG

## 2022-02-24 DIAGNOSIS — R09.81 COMPLAINT OF NASAL CONGESTION: ICD-10-CM

## 2022-02-24 DIAGNOSIS — J01.01 ACUTE RECURRENT MAXILLARY SINUSITIS: ICD-10-CM

## 2022-02-24 PROCEDURE — 99213 OFFICE O/P EST LOW 20 MIN: CPT | Performed by: PHYSICIAN ASSISTANT

## 2022-02-24 PROCEDURE — U0003 INFECTIOUS AGENT DETECTION BY NUCLEIC ACID (DNA OR RNA); SEVERE ACUTE RESPIRATORY SYNDROME CORONAVIRUS 2 (SARS-COV-2) (CORONAVIRUS DISEASE [COVID-19]), AMPLIFIED PROBE TECHNIQUE, MAKING USE OF HIGH THROUGHPUT TECHNOLOGIES AS DESCRIBED BY CMS-2020-01-R: HCPCS

## 2022-02-24 PROCEDURE — U0005 INFEC AGEN DETEC AMPLI PROBE: HCPCS

## 2022-02-24 RX ORDER — DOXYCYCLINE 100 MG/1
100 CAPSULE ORAL 2 TIMES DAILY
Qty: 10 CAPSULE | Refills: 0 | Status: SHIPPED | OUTPATIENT
Start: 2022-02-24 | End: 2022-03-01

## 2022-02-24 ASSESSMENT — ENCOUNTER SYMPTOMS
WHEEZING: 0
SPUTUM PRODUCTION: 0
CONSTIPATION: 0
CHILLS: 0
DIARRHEA: 0
EYE REDNESS: 0
VOMITING: 0
NAUSEA: 0
SORE THROAT: 1
WEAKNESS: 0
SINUS PAIN: 1
EYE DISCHARGE: 0
FEVER: 0
DIZZINESS: 0
COUGH: 1
NECK PAIN: 0
ORTHOPNEA: 0
ABDOMINAL PAIN: 0
HEADACHES: 0
MYALGIAS: 0
SHORTNESS OF BREATH: 0
PALPITATIONS: 0

## 2022-02-24 ASSESSMENT — FIBROSIS 4 INDEX: FIB4 SCORE: 0.39

## 2022-02-24 NOTE — LETTER
February 24, 2022    To Whom It May Concern:         This is confirmation that Thomas Drake attended his scheduled appointment with Kristina Lake P.A.-C. on 2/24/22. Please excuse patient from work 2/24 (afternoon)  through 2/26/22.         If you have any questions please do not hesitate to call me at the phone number listed below.    Sincerely,          Kristina Lake P.A.-C.  [phone]

## 2022-02-25 DIAGNOSIS — R09.81 COMPLAINT OF NASAL CONGESTION: ICD-10-CM

## 2022-02-25 DIAGNOSIS — J01.01 ACUTE RECURRENT MAXILLARY SINUSITIS: ICD-10-CM

## 2022-02-25 LAB — COVID ORDER STATUS COVID19: NORMAL

## 2022-02-25 NOTE — PROGRESS NOTES
Subjective:   Thomas Drake is a 51 y.o. male who presents for Sinus Problem (X6days , sinus infection , sinus head pressure, bloody nose, and now having chest congestion, chills )  Patient presents with chief complaint of 6-day history of sinus pain, headaches, mild cough, stuffy nose alternating with rhinorrhea. He denies fever chills. He is not been vaccinated against Covid. He did have Covid in November 2020. No abdominal pain, nausea, vomiting. Reports frequent episodes of sinusitis, bacterial, in the past. Feels very similar to this episode which has not responded to OTC medications including antihistamines and nasal lavage.        Review of Systems   Constitutional: Positive for malaise/fatigue. Negative for chills and fever.   HENT: Positive for congestion, ear pain, sinus pain and sore throat.    Eyes: Negative for discharge and redness.   Respiratory: Positive for cough. Negative for sputum production, shortness of breath and wheezing.    Cardiovascular: Negative for chest pain, palpitations and orthopnea.   Gastrointestinal: Negative for abdominal pain, constipation, diarrhea, nausea and vomiting.   Musculoskeletal: Negative for myalgias and neck pain.   Skin: Negative for itching and rash.   Neurological: Negative for dizziness, weakness and headaches.   Endo/Heme/Allergies: Negative for environmental allergies.   All other systems reviewed and are negative.      Medications:  albuterol Aers  Alcohol Swabs  Blood Glucose Meter Kit  Blood Glucose Test Strips  CLARITIN PO  Empagliflozin Tabs  fluticasone  Insulin Pen Needle 32 G x 4 mm  Lancets  Lantus SoloStar Sopn  metformin  montelukast Tabs  OneTouch Ultra Strp    Allergies:             Amoxicillin, Paroxetine, and Atorvastatin calcium    Surgical History:         Past Surgical History:   Procedure Laterality Date   • OTHER ORTHOPEDIC SURGERY      L4-L5 lumbar discectomy   • OTHER ORTHOPEDIC SURGERY Left     foot repair without hardware placement  "      Past Social Hx:  Thomas Drake  reports that he quit smoking about 10 years ago. His smoking use included cigarettes. He has a 30.00 pack-year smoking history. He quit smokeless tobacco use about 6 months ago.  His smokeless tobacco use included chew. He reports current alcohol use. He reports current drug use. Frequency: 7.00 times per week. Drug: Inhaled.     Past Family Hx:   Thomas Drake family history includes Diabetes in his brother, father, and paternal grandmother; Heart Disease in his paternal grandmother; Hyperlipidemia in his brother, father, and paternal grandmother; Hypertension in his brother, father, and paternal grandmother.       Problem list, medications, and allergies reviewed by myself today in Epic.     Objective:     /88 (BP Location: Left arm, Patient Position: Sitting, BP Cuff Size: Adult)   Pulse 90   Temp 36.9 °C (98.5 °F) (Temporal)   Resp 16   Ht 1.727 m (5' 8\")   Wt 79.4 kg (175 lb)   SpO2 96%   BMI 26.61 kg/m²     Physical Exam  Vitals reviewed.   Constitutional:       General: He is not in acute distress.     Appearance: He is well-developed. He is not ill-appearing, toxic-appearing or diaphoretic.   HENT:      Head: Normocephalic.      Right Ear: Ear canal and external ear normal. Tympanic membrane is injected.      Left Ear: Ear canal and external ear normal. Tympanic membrane is injected.      Nose: Mucosal edema and rhinorrhea present.      Right Sinus: Maxillary sinus tenderness and frontal sinus tenderness present.      Left Sinus: Maxillary sinus tenderness and frontal sinus tenderness present.      Mouth/Throat:      Mouth: Mucous membranes are dry.      Pharynx: Uvula midline. Posterior oropharyngeal erythema present. No uvula swelling.   Eyes:      Conjunctiva/sclera: Conjunctivae normal.      Pupils: Pupils are equal, round, and reactive to light.   Cardiovascular:      Rate and Rhythm: Normal rate and regular rhythm.   Pulmonary:      Effort: Pulmonary " effort is normal. No accessory muscle usage or respiratory distress.      Breath sounds: Normal breath sounds. No decreased breath sounds, wheezing, rhonchi or rales.   Musculoskeletal:         General: Normal range of motion.      Cervical back: Normal range of motion and neck supple.   Lymphadenopathy:      Cervical: No cervical adenopathy.   Skin:     General: Skin is warm and dry.   Neurological:      Mental Status: He is alert and oriented to person, place, and time.   Psychiatric:         Behavior: Behavior is cooperative.         Assessment/Plan:     Diagnosis and Associated Orders:     1. Complaint of nasal congestion  - SARS-CoV-2 PCR (24 hour In-House): Collect NP swab in VTM; Future    2. Acute recurrent maxillary sinusitis  - doxycycline (MONODOX) 100 MG capsule; Take 1 Capsule by mouth 2 times a day for 5 days.  Dispense: 10 Capsule; Refill: 0  - SARS-CoV-2 PCR (24 hour In-House): Collect NP swab in VTM; Future      Comments/MDM:  Symptoms consistent with acute rhinosinusitis. Explained course of viral versus bacterial. Explained watchful waiting. Patient has allergy to amoxicillin. Given contingent prescription for doxycycline. Continue antihistamine, Flonase, nasal saline spray.      Patient's vital signs are reassuring and they appear hemodynamically stable and do not require higher level care at this time.  They are not hypoxic and have a normal pulmonary exam.  I discussed self isolation and ER precautions.  Patient should to proceed to ED for development of symptoms including but not limited to shortness of breath breath, respiratory distress, or worsening symptoms not manageable at home.  I instructed the patient to try to follow up with their PCP (if applicable) for follow up care  If requested, I provided the patient with a work note to provide to their employer or school regarding returning to work and discontinuation of self isolation.  Symptomatic and supportive care:   Plenty of oral  "hydration and rest   Over the counter cough suppressant as directed.  Tylenol or ibuprofen for pain and fever as directed.   Warm salt water gargles for sore throat, soft foods, cool liquids.   Saline nasal spray and Flonase as a decongestant.   Infection control measures at home. Stay away from people, Hand washing, covering sneeze/cough, disinfect surfaces.   Remain home from work, school, and other populated environments.   All questions were answered and patient demonstrated verbal understanding of above.  The patient will receive results via [employer] (\"detected\" is a positive result, \"not detected\" indicates a negative result) or in clinic with rapid test.           I personally reviewed prior external notes and test results pertinent to today's visit.  Red flags discussed as well as indications to present to the Emergency Department.  Supportive care, natural history, differential diagnoses, and indications for immediate follow-up discussed.  Patient expresses understanding and agrees to plan.  Patient denies any other questions or concerns.    Follow-up with the primary care physician for recheck, reevaluation, and consideration of further management.      Please note that this dictation was created using voice recognition software. I have made a reasonable attempt to correct obvious errors, but I expect that there are errors of grammar and possibly content that I did not discover before finalizing the note.    This note was electronically signed by Kristina Lake PA-C        "

## 2022-02-26 LAB
SARS-COV-2 RNA RESP QL NAA+PROBE: NOTDETECTED
SPECIMEN SOURCE: NORMAL

## 2023-06-06 NOTE — ASSESSMENT & PLAN NOTE
This is a chronic condition, not controlled.  He reports he quit nicotine for 8 years; however, resumed nicotine use when he moved to [place] 1/2020.  He reports that he is no longer smoking cigarettes, but he is using vape pens with nicotine.  Previously smoked 1-4 packs of cigarettes for 30 years; 1 can of chewing tobacco daily.   > Smoking cessation counseling provided.    18

## 2024-03-27 ENCOUNTER — OFFICE VISIT (OUTPATIENT)
Dept: URGENT CARE | Facility: PHYSICIAN GROUP | Age: 54
End: 2024-03-27
Payer: COMMERCIAL

## 2024-03-27 VITALS
SYSTOLIC BLOOD PRESSURE: 130 MMHG | HEIGHT: 67 IN | OXYGEN SATURATION: 95 % | RESPIRATION RATE: 16 BRPM | WEIGHT: 165 LBS | TEMPERATURE: 98.8 F | BODY MASS INDEX: 25.9 KG/M2 | HEART RATE: 95 BPM | DIASTOLIC BLOOD PRESSURE: 80 MMHG

## 2024-03-27 DIAGNOSIS — Z76.0 MEDICATION REFILL: ICD-10-CM

## 2024-03-27 DIAGNOSIS — E11.65 TYPE 2 DIABETES MELLITUS WITH HYPERGLYCEMIA, WITHOUT LONG-TERM CURRENT USE OF INSULIN (HCC): ICD-10-CM

## 2024-03-27 LAB — GLUCOSE BLD-MCNC: 359 MG/DL (ref 65–99)

## 2024-03-27 RX ORDER — GLIMEPIRIDE 2 MG/1
2 TABLET ORAL
COMMUNITY
Start: 2024-02-12

## 2024-03-27 RX ORDER — METFORMIN HYDROCHLORIDE 500 MG/1
2000 TABLET, EXTENDED RELEASE ORAL
COMMUNITY
Start: 2024-02-12

## 2024-03-27 RX ORDER — OMEPRAZOLE 20 MG/1
20 CAPSULE, DELAYED RELEASE ORAL DAILY
COMMUNITY
Start: 2023-10-10

## 2024-03-27 RX ORDER — METFORMIN HYDROCHLORIDE 500 MG/1
TABLET, EXTENDED RELEASE ORAL
Qty: 120 TABLET | Refills: 0 | Status: SHIPPED | OUTPATIENT
Start: 2024-03-27

## 2024-03-27 RX ORDER — GLIMEPIRIDE 2 MG/1
TABLET ORAL
Qty: 30 TABLET | Refills: 0 | Status: SHIPPED | OUTPATIENT
Start: 2024-03-27

## 2024-03-27 NOTE — PROGRESS NOTES
"Subjective:   Thomas Drake is a 53 y.o. male who presents for Diabetes (Diabetes medication refills, pt just [facility] into [facility],. /Refills on metformin and glimepiride )      HPI  The patient is a 53-year-old male with a history of diabetes type 2 who presents to the Urgent Care for medication refill.  He states he recently moved back to [place] from [place] and has established medical insurance and in the process of establishing a PCP.  He feels his glucose levels are high.  He has been out of his diabetes medications for 2 to 3 weeks.  He is requesting refills of metformin and glimepiride.  He ate judge and eggs this morning.  Symptoms of occasional lightheadedness but no syncope. Denies any abdominal pain, vomiting. No urinary symptoms.  Denies history of DKA.  He has not been able to monitor his glucose levels recently.        Past Medical History:   Diagnosis Date    Asthma     Diabetes (HCC)     type 2    Essential hypertension, benign 9/22/2008    Extrinsic asthma 9/30/2008    Hyperlipidemia     Hypertension      Allergies   Allergen Reactions    Amoxicillin Swelling     Joint aches, swelling    Paroxetine Unspecified     Non specific intolerance.    Atorvastatin Calcium Unspecified     myalgias        Objective:     /80 (BP Location: Right arm, Patient Position: Sitting, BP Cuff Size: Adult)   Pulse 95   Temp 37.1 °C (98.8 °F) (Temporal)   Resp 16   Ht 1.702 m (5' 7\")   Wt 74.8 kg (165 lb)   SpO2 95%   BMI 25.84 kg/m²     Physical Exam  Vitals reviewed.   Constitutional:       General: He is not in acute distress.     Appearance: Normal appearance. He is not ill-appearing or toxic-appearing.   Eyes:      Conjunctiva/sclera: Conjunctivae normal.   Cardiovascular:      Rate and Rhythm: Normal rate.   Pulmonary:      Effort: Pulmonary effort is normal.   Musculoskeletal:      Cervical back: Neck supple. No rigidity.      Right lower leg: No edema.      Left lower leg: No edema.   Skin:     " General: Skin is warm and dry.   Neurological:      General: No focal deficit present.      Mental Status: He is alert and oriented to person, place, and time.   Psychiatric:         Mood and Affect: Mood normal.         Behavior: Behavior normal.       Results for orders placed or performed in visit on 03/27/24   POCT Glucose   Result Value Ref Range    Glucose - Accu-Ck 359 (A) 65 - 99 mg/dL       Diagnosis and associated orders:     1. Medication refill    - metFORMIN ER (GLUCOPHAGE XR) 500 MG TABLET SR 24 HR; Take four tabs by mouth daily with breakfast  Dispense: 120 Tablet; Refill: 0  - glimepiride (AMARYL) 2 MG Tab; Take 1 tablet by mouth every day with breakfast  Dispense: 30 Tablet; Refill: 0  - POCT Glucose  - Referral back to PCP  - Referral to Endocrinology    2. Type 2 diabetes mellitus with hyperglycemia, without long-term current use of insulin (HCC)    - metFORMIN ER (GLUCOPHAGE XR) 500 MG TABLET SR 24 HR; Take four tabs by mouth daily with breakfast  Dispense: 120 Tablet; Refill: 0  - glimepiride (AMARYL) 2 MG Tab; Take 1 tablet by mouth every day with breakfast  Dispense: 30 Tablet; Refill: 0  - POCT Glucose  - Referral back to PCP  - Referral to Endocrinology     Comments/MDM:     This is a 53-year-old male with a history of diabetes type 2 requesting medication refills of glimepiride and metformin.  He has been out of these medications for 2 to 3 weeks he states.  He feels as if his glucose levels are high.  He has been unable to monitor his glucose levels.  Nonfasting glucose level today 359.  Reports of occasional dizziness.  No chest pain or shortness of breath no abdominal pain or vomiting.  Normal vital signs.  Medications refilled.  Closely monitor glucose levels.  Urgent referral to endocrinology and PCP for follow-up and establishment. Red flags discussed and indications to present to the Emergency Department.        I personally reviewed prior external notes and test results pertinent to  today's visit. Pathogenesis of diagnosis discussed including typical length and natural progression. Supportive care, natural history, differential diagnoses, and indications for immediate follow-up discussed. Patient expresses understanding and agrees to plan. Patient denies any other questions or concerns.     Follow-up with the primary care physician for recheck, reevaluation, and consideration of further management.    Time spent evaluating the patient was 32 minutes which included preparing for the visit, obtaining history, examination, ordering labs/tests/procedures/medications, independent interpretation, discussion of plan, counseling/education, and documentation into chart.     Please note that this dictation was created using voice recognition software. I have made a reasonable attempt to correct obvious errors, but I expect that there are errors of grammar and possibly content that I did not discover before finalizing the note.    This note was electronically signed by Victorino Galvan PA-C

## 2024-03-27 NOTE — LETTER
March 27, 2024         Patient: Thomas Drake   YOB: 1970   Date of Visit: 3/27/2024           To Whom it May Concern:    Thomas Drake was seen in my clinic on 3/27/2024.     If you have any questions or concerns, please don't hesitate to call.        Sincerely,           Victorino Galvan P.A.-C.  Electronically Signed

## 2024-05-01 SDOH — HEALTH STABILITY: MENTAL HEALTH
STRESS IS WHEN SOMEONE FEELS TENSE, NERVOUS, ANXIOUS, OR CAN'T SLEEP AT NIGHT BECAUSE THEIR MIND IS TROUBLED. HOW STRESSED ARE YOU?: NOT AT ALL

## 2024-05-01 SDOH — ECONOMIC STABILITY: INCOME INSECURITY: HOW HARD IS IT FOR YOU TO PAY FOR THE VERY BASICS LIKE FOOD, HOUSING, MEDICAL CARE, AND HEATING?: HARD

## 2024-05-01 SDOH — ECONOMIC STABILITY: HOUSING INSECURITY: IN THE LAST 12 MONTHS, HOW MANY PLACES HAVE YOU LIVED?: 2

## 2024-05-01 SDOH — ECONOMIC STABILITY: TRANSPORTATION INSECURITY
IN THE PAST 12 MONTHS, HAS THE LACK OF TRANSPORTATION KEPT YOU FROM MEDICAL APPOINTMENTS OR FROM GETTING MEDICATIONS?: PATIENT DECLINED

## 2024-05-01 SDOH — HEALTH STABILITY: PHYSICAL HEALTH: ON AVERAGE, HOW MANY DAYS PER WEEK DO YOU ENGAGE IN MODERATE TO STRENUOUS EXERCISE (LIKE A BRISK WALK)?: 4 DAYS

## 2024-05-01 SDOH — ECONOMIC STABILITY: FOOD INSECURITY: WITHIN THE PAST 12 MONTHS, THE FOOD YOU BOUGHT JUST DIDN'T LAST AND YOU DIDN'T HAVE MONEY TO GET MORE.: NEVER TRUE

## 2024-05-01 SDOH — ECONOMIC STABILITY: INCOME INSECURITY: IN THE LAST 12 MONTHS, WAS THERE A TIME WHEN YOU WERE NOT ABLE TO PAY THE MORTGAGE OR RENT ON TIME?: NO

## 2024-05-01 SDOH — HEALTH STABILITY: PHYSICAL HEALTH: ON AVERAGE, HOW MANY MINUTES DO YOU ENGAGE IN EXERCISE AT THIS LEVEL?: 150+ MIN

## 2024-05-01 SDOH — ECONOMIC STABILITY: FOOD INSECURITY: WITHIN THE PAST 12 MONTHS, YOU WORRIED THAT YOUR FOOD WOULD RUN OUT BEFORE YOU GOT MONEY TO BUY MORE.: OFTEN TRUE

## 2024-05-01 SDOH — ECONOMIC STABILITY: HOUSING INSECURITY
IN THE LAST 12 MONTHS, WAS THERE A TIME WHEN YOU DID NOT HAVE A STEADY PLACE TO SLEEP OR SLEPT IN A SHELTER (INCLUDING NOW)?: NO

## 2024-05-01 SDOH — ECONOMIC STABILITY: TRANSPORTATION INSECURITY
IN THE PAST 12 MONTHS, HAS LACK OF RELIABLE TRANSPORTATION KEPT YOU FROM MEDICAL APPOINTMENTS, MEETINGS, WORK OR FROM GETTING THINGS NEEDED FOR DAILY LIVING?: PATIENT DECLINED

## 2024-05-01 SDOH — ECONOMIC STABILITY: TRANSPORTATION INSECURITY
IN THE PAST 12 MONTHS, HAS LACK OF TRANSPORTATION KEPT YOU FROM MEETINGS, WORK, OR FROM GETTING THINGS NEEDED FOR DAILY LIVING?: PATIENT DECLINED

## 2024-05-01 ASSESSMENT — SOCIAL DETERMINANTS OF HEALTH (SDOH)
HOW HARD IS IT FOR YOU TO PAY FOR THE VERY BASICS LIKE FOOD, HOUSING, MEDICAL CARE, AND HEATING?: HARD
IN A TYPICAL WEEK, HOW MANY TIMES DO YOU TALK ON THE PHONE WITH FAMILY, FRIENDS, OR NEIGHBORS?: PATIENT DECLINED
IN A TYPICAL WEEK, HOW MANY TIMES DO YOU TALK ON THE PHONE WITH FAMILY, FRIENDS, OR NEIGHBORS?: PATIENT DECLINED
DO YOU BELONG TO ANY CLUBS OR ORGANIZATIONS SUCH AS CHURCH GROUPS UNIONS, FRATERNAL OR ATHLETIC GROUPS, OR SCHOOL GROUPS?: NO
HOW OFTEN DO YOU ATTEND CHURCH OR RELIGIOUS SERVICES?: NEVER
HOW OFTEN DO YOU GET TOGETHER WITH FRIENDS OR RELATIVES?: PATIENT DECLINED
HOW OFTEN DO YOU ATTEND CHURCH OR RELIGIOUS SERVICES?: NEVER
DO YOU BELONG TO ANY CLUBS OR ORGANIZATIONS SUCH AS CHURCH GROUPS UNIONS, FRATERNAL OR ATHLETIC GROUPS, OR SCHOOL GROUPS?: NO
HOW OFTEN DO YOU GET TOGETHER WITH FRIENDS OR RELATIVES?: PATIENT DECLINED
HOW OFTEN DO YOU HAVE SIX OR MORE DRINKS ON ONE OCCASION: LESS THAN MONTHLY
HOW OFTEN DO YOU HAVE A DRINK CONTAINING ALCOHOL: 2-3 TIMES A WEEK
HOW OFTEN DO YOU ATTENT MEETINGS OF THE CLUB OR ORGANIZATION YOU BELONG TO?: PATIENT DECLINED
WITHIN THE PAST 12 MONTHS, YOU WORRIED THAT YOUR FOOD WOULD RUN OUT BEFORE YOU GOT THE MONEY TO BUY MORE: OFTEN TRUE
HOW MANY DRINKS CONTAINING ALCOHOL DO YOU HAVE ON A TYPICAL DAY WHEN YOU ARE DRINKING: 1 OR 2
HOW OFTEN DO YOU ATTENT MEETINGS OF THE CLUB OR ORGANIZATION YOU BELONG TO?: PATIENT DECLINED

## 2024-05-01 ASSESSMENT — LIFESTYLE VARIABLES
AUDIT-C TOTAL SCORE: 4
SKIP TO QUESTIONS 9-10: 0
HOW OFTEN DO YOU HAVE SIX OR MORE DRINKS ON ONE OCCASION: LESS THAN MONTHLY
HOW OFTEN DO YOU HAVE A DRINK CONTAINING ALCOHOL: 2-3 TIMES A WEEK
HOW MANY STANDARD DRINKS CONTAINING ALCOHOL DO YOU HAVE ON A TYPICAL DAY: 1 OR 2

## 2024-05-03 ENCOUNTER — HOSPITAL ENCOUNTER (OUTPATIENT)
Facility: MEDICAL CENTER | Age: 54
End: 2024-05-03
Attending: STUDENT IN AN ORGANIZED HEALTH CARE EDUCATION/TRAINING PROGRAM
Payer: COMMERCIAL

## 2024-05-03 ENCOUNTER — OFFICE VISIT (OUTPATIENT)
Dept: MEDICAL GROUP | Age: 54
End: 2024-05-03
Attending: PHYSICIAN ASSISTANT
Payer: COMMERCIAL

## 2024-05-03 VITALS
DIASTOLIC BLOOD PRESSURE: 76 MMHG | TEMPERATURE: 97.6 F | WEIGHT: 167 LBS | OXYGEN SATURATION: 96 % | SYSTOLIC BLOOD PRESSURE: 126 MMHG | HEART RATE: 88 BPM | BODY MASS INDEX: 26.21 KG/M2 | HEIGHT: 67 IN

## 2024-05-03 DIAGNOSIS — Z00.00 BLOOD TESTS FOR ROUTINE GENERAL PHYSICAL EXAMINATION: ICD-10-CM

## 2024-05-03 DIAGNOSIS — E78.2 MIXED DYSLIPIDEMIA: ICD-10-CM

## 2024-05-03 DIAGNOSIS — Z12.11 COLON CANCER SCREENING: ICD-10-CM

## 2024-05-03 DIAGNOSIS — Z11.59 NEED FOR HEPATITIS C SCREENING TEST: ICD-10-CM

## 2024-05-03 DIAGNOSIS — Z87.891 HISTORY OF CIGARETTE SMOKING: ICD-10-CM

## 2024-05-03 DIAGNOSIS — E11.9 TYPE 2 DIABETES MELLITUS WITHOUT COMPLICATION, WITHOUT LONG-TERM CURRENT USE OF INSULIN (HCC): ICD-10-CM

## 2024-05-03 DIAGNOSIS — Z11.4 SCREENING FOR HIV (HUMAN IMMUNODEFICIENCY VIRUS): ICD-10-CM

## 2024-05-03 PROCEDURE — 3074F SYST BP LT 130 MM HG: CPT | Performed by: STUDENT IN AN ORGANIZED HEALTH CARE EDUCATION/TRAINING PROGRAM

## 2024-05-03 PROCEDURE — 3078F DIAST BP <80 MM HG: CPT | Performed by: STUDENT IN AN ORGANIZED HEALTH CARE EDUCATION/TRAINING PROGRAM

## 2024-05-03 PROCEDURE — 99214 OFFICE O/P EST MOD 30 MIN: CPT | Performed by: STUDENT IN AN ORGANIZED HEALTH CARE EDUCATION/TRAINING PROGRAM

## 2024-05-03 PROCEDURE — 92250 FUNDUS PHOTOGRAPHY W/I&R: CPT | Mod: 26 | Performed by: STUDENT IN AN ORGANIZED HEALTH CARE EDUCATION/TRAINING PROGRAM

## 2024-05-03 RX ORDER — GLIMEPIRIDE 2 MG/1
2 TABLET ORAL EVERY MORNING
Qty: 90 TABLET | Refills: 2 | Status: SHIPPED | OUTPATIENT
Start: 2024-05-03

## 2024-05-03 ASSESSMENT — ENCOUNTER SYMPTOMS
DIZZINESS: 0
ORTHOPNEA: 0
WEAKNESS: 0
MYALGIAS: 0
WHEEZING: 0
BLURRED VISION: 0
DOUBLE VISION: 0
DEPRESSION: 0
ABDOMINAL PAIN: 0
PALPITATIONS: 0
HEARTBURN: 0
HEADACHES: 0
SHORTNESS OF BREATH: 0
SENSORY CHANGE: 0
BRUISES/BLEEDS EASILY: 0
COUGH: 0
CHILLS: 0
FEVER: 0
NECK PAIN: 0
BLOOD IN STOOL: 0

## 2024-05-03 ASSESSMENT — PATIENT HEALTH QUESTIONNAIRE - PHQ9: CLINICAL INTERPRETATION OF PHQ2 SCORE: 0

## 2024-05-03 NOTE — PROGRESS NOTES
Subjective:     CC: Establish care    HPI:   Thomas presents today to establish car and discusse immunizations, screening and chronic medical conditions.  Patient has a past medical history of diabetes, dyslipidemia and seasonal allergies.  Patient recently moved to [place] from [facility] and he came to see me to establish care with a new primary care provider.  Patient current medications are metformin 1 g twice daily and glimepiride 2 mg daily.  Patient states that he ran out of medications about 4 days ago.  He was also seen at [facility] urgent care on March 27 for the same reason he was out of medications for 3 weeks.  Patient states that over the past several months he has been having issues refilling his medications due to issues with his insurance.  Per chart review his most recent A1c on records was from last year in May, his A1c was 11.4%.  Patient is stated that he was given insulin temporarily.  He believes that his A1c improved after he was given insulin, but he cannot recall the date that it was done nor the result, but he states that he was significantly improved since he was told to discontinue insulin.    Problem   Mixed Dyslipidemia   DM (Diabetes Mellitus), Type 2 (Hcc)    Formatting of this note might be different from the original.         ROS:  Review of Systems   Constitutional:  Negative for chills, fever and malaise/fatigue.   HENT:  Negative for nosebleeds and tinnitus.    Eyes:  Negative for blurred vision and double vision.   Respiratory:  Negative for cough, shortness of breath and wheezing.    Cardiovascular:  Negative for chest pain, palpitations, orthopnea and leg swelling.   Gastrointestinal:  Negative for abdominal pain, blood in stool, heartburn and melena.   Genitourinary:  Negative for dysuria and urgency.   Musculoskeletal:  Negative for myalgias and neck pain.   Skin:  Negative for rash.   Neurological:  Negative for dizziness, sensory change, weakness and  "headaches.   Endo/Heme/Allergies:  Does not bruise/bleed easily.   Psychiatric/Behavioral:  Negative for depression and suicidal ideas.        Objective:     Exam:  /76 (BP Location: Right arm, Patient Position: Sitting, BP Cuff Size: Adult)   Pulse 88   Temp 36.4 °C (97.6 °F) (Temporal)   Ht 1.702 m (5' 7\")   Wt 75.8 kg (167 lb)   SpO2 96%   BMI 26.16 kg/m²  Body mass index is 26.16 kg/m².    Physical Exam  Vitals reviewed.   Constitutional:       General: He is not in acute distress.  HENT:      Head: Normocephalic and atraumatic.      Right Ear: Tympanic membrane and ear canal normal.      Left Ear: Tympanic membrane and ear canal normal.      Nose: No congestion.      Mouth/Throat:      Mouth: Mucous membranes are moist.      Pharynx: No oropharyngeal exudate or posterior oropharyngeal erythema.   Eyes:      General: No scleral icterus.     Extraocular Movements: Extraocular movements intact.      Pupils: Pupils are equal, round, and reactive to light.   Cardiovascular:      Rate and Rhythm: Normal rate and regular rhythm.      Pulses: Normal pulses.      Heart sounds: Normal heart sounds. No murmur heard.  Pulmonary:      Effort: Pulmonary effort is normal. No respiratory distress.      Breath sounds: Normal breath sounds. No wheezing.   Abdominal:      General: Bowel sounds are normal. There is no distension.      Palpations: Abdomen is soft.      Tenderness: There is no abdominal tenderness. There is no guarding or rebound.   Musculoskeletal:      Cervical back: Normal range of motion and neck supple.      Right lower leg: No edema.      Left lower leg: No edema.      Comments: Monofilament testing with a 10 gram force: sensation intact: intact bilaterally  Visual Inspection: Feet without maceration, ulcers, fissures.  Pedal pulses: intact bilaterally    Lymphadenopathy:      Cervical: No cervical adenopathy.   Skin:     General: Skin is warm.      Capillary Refill: Capillary refill takes less " than 2 seconds.      Findings: No bruising or erythema.   Neurological:      General: No focal deficit present.      Mental Status: He is alert.      Cranial Nerves: No cranial nerve deficit.      Sensory: No sensory deficit.   Psychiatric:         Mood and Affect: Mood normal.         Behavior: Behavior normal.       Labs: Ordered    Assessment & Plan:     53 y.o. male with the following -     1. Type 2 diabetes mellitus without complication, without long-term current use of insulin ([facility])  -Chronic, per patient well-controlled, however would not have any recent A1c on records and patient has had issues getting his medications on time  -Patient currently on metformin at 1000 mg twice daily and Amaryl 2 mg daily  -Patient is asymptomatic today  -Normal monofilament test  -Patient states that had an eye examination within the last year and he was told that he did not have retinopathy  -Continue same therapy  -Ordering routine labs  -Follow-up in 2 weeks    - POCT Retinal Eye Exam  - Microalbumin Creat Ratio Urine (Clinic Collect); Future  - Diabetic Monofilament LE Exam  - Comp Metabolic Panel; Future  - Hemoglobin A1c; Future  - Lipid Profile; Future  - VITAMIN D,25 HYDROXY (DEFICIENCY); Future  - VITAMIN B12; Future  - glimepiride (AMARYL) 2 MG Tab; Take 1 Tablet by mouth every morning.  Dispense: 90 Tablet; Refill: 2  - metformin (GLUCOPHAGE) 1000 MG tablet; Take 1 Tablet by mouth 2 times a day with meals.  Dispense: 90 Tablet; Refill: 3    2. Mixed dyslipidemia  -No recent labs  -Patient currently not taking lowering cholesterol medication  -I will order lipid profile to decide dose of statin recommendation    3. Colon cancer screening  -Due for screening  - Referral to GI for Colonoscopy    4. Screening for HIV (human immunodeficiency virus)  -Due for screening  - HIV AG/AB COMBO ASSAY SCREENING; Future    5. Need for hepatitis C screening test  -Due for screening  - HEP C VIRUS ANTIBODY; Future    6. History of  cigarette smoking  -Patient has a 30-year pack history of smoking  -Quit about 12 years ago  -Patient qualifies for lung cancer screening  - REFERRAL TO LUNG CANCER SCREENING PROGRAM    7. Blood tests for routine general physical examination  -Appropriate lab work  - TSH; Future       Return in about 2 weeks (around 5/17/2024) for Labs, DM.    Please note that this dictation was created using voice recognition software. I have made every reasonable attempt to correct obvious errors, but I expect that there are errors of grammar and possibly content that I did not discover before finalizing the note.

## 2024-05-04 LAB
CREAT UR-MCNC: 186.58 MG/DL
MICROALBUMIN UR-MCNC: 8.3 MG/DL
MICROALBUMIN/CREAT UR: 44 MG/G (ref 0–30)

## 2024-05-07 DIAGNOSIS — E11.3313 MODERATE NONPROLIFERATIVE DIABETIC RETINOPATHY OF BOTH EYES WITH MACULAR EDEMA ASSOCIATED WITH TYPE 2 DIABETES MELLITUS (HCC): ICD-10-CM

## 2024-05-07 LAB — RETINAL SCREEN: POSITIVE

## 2024-05-08 ENCOUNTER — OFFICE VISIT (OUTPATIENT)
Dept: URGENT CARE | Facility: PHYSICIAN GROUP | Age: 54
End: 2024-05-08
Payer: COMMERCIAL

## 2024-05-08 VITALS
SYSTOLIC BLOOD PRESSURE: 122 MMHG | HEIGHT: 67 IN | TEMPERATURE: 98.1 F | RESPIRATION RATE: 18 BRPM | OXYGEN SATURATION: 98 % | BODY MASS INDEX: 26.21 KG/M2 | HEART RATE: 103 BPM | DIASTOLIC BLOOD PRESSURE: 70 MMHG | WEIGHT: 167 LBS

## 2024-05-08 DIAGNOSIS — J01.90 ACUTE BACTERIAL RHINOSINUSITIS: ICD-10-CM

## 2024-05-08 DIAGNOSIS — R00.0 SINUS TACHYCARDIA: ICD-10-CM

## 2024-05-08 DIAGNOSIS — B96.89 ACUTE BACTERIAL RHINOSINUSITIS: ICD-10-CM

## 2024-05-08 PROCEDURE — 3078F DIAST BP <80 MM HG: CPT | Performed by: STUDENT IN AN ORGANIZED HEALTH CARE EDUCATION/TRAINING PROGRAM

## 2024-05-08 PROCEDURE — 3074F SYST BP LT 130 MM HG: CPT | Performed by: STUDENT IN AN ORGANIZED HEALTH CARE EDUCATION/TRAINING PROGRAM

## 2024-05-08 PROCEDURE — 99214 OFFICE O/P EST MOD 30 MIN: CPT | Performed by: STUDENT IN AN ORGANIZED HEALTH CARE EDUCATION/TRAINING PROGRAM

## 2024-05-08 RX ORDER — AMOXICILLIN AND CLAVULANATE POTASSIUM 875; 125 MG/1; MG/1
1 TABLET, FILM COATED ORAL 2 TIMES DAILY
Qty: 10 TABLET | Refills: 0 | Status: SHIPPED | OUTPATIENT
Start: 2024-05-08 | End: 2024-05-13

## 2024-05-08 NOTE — PROGRESS NOTES
Subjective:   CHIEF COMPLAINT  Chief Complaint   Patient presents with    Sinusitis     X 1 month        HPI  Thomas Drake is a 53 y.o. male who presents for evaluation of a possible sinus infection.  Reports he has been experiencing sinus/head pressure for the last 3 to 5 weeks.  Symptoms improved and returned and worsened over the last 3 days.  Now says he is experiencing fevers.  Reports a Tmax of 106.  Currently is afebrile but has been taking Tylenol and DayQuil which provide some relief.  Also says he takes daily Zyrtec and Flonase.  Says the congestion is now moving to his chest and is experiencing a cough.  Attributes the cough secondary to PND.  Says he is experiencing some shortness of breath but no wheezing.  No fevers.  No chest pain.  History of asthma but has not tried using his albuterol.  Requesting a note for work.  REVIEW OF SYSTEMS  General: no fever or chills  GI: no nausea or vomiting  See HPI for further details.    PAST MEDICAL HISTORY  Patient Active Problem List    Diagnosis Date Noted    Seasonal allergies 08/27/2021    Tobacco use 08/27/2021    Mixed dyslipidemia 01/28/2010    DM (diabetes mellitus), type 2 (HCC) 01/28/2010       SURGICAL HISTORY   has a past surgical history that includes other orthopedic surgery and other orthopedic surgery (Left).    ALLERGIES  Allergies   Allergen Reactions    Amoxicillin Swelling     Joint aches, swelling    Paroxetine Unspecified     Non specific intolerance.    Atorvastatin Calcium Unspecified     myalgias       CURRENT MEDICATIONS  Home Medications       Reviewed by Bruno Russell D.O. (Physician) on 05/08/24 at 1208  Med List Status: <None>     Medication Last Dose Status   amoxicillin-clavulanate (AUGMENTIN) 875-125 MG Tab  Active   glimepiride (AMARYL) 2 MG Tab Taking Active   metformin (GLUCOPHAGE) 1000 MG tablet Taking Active   omeprazole (PRILOSEC) 20 MG delayed-release capsule Taking Active                    SOCIAL  "HISTORY  Social History     Tobacco Use    Smoking status: Former     Current packs/day: 0.00     Average packs/day: 1 pack/day for 30.0 years (30.0 ttl pk-yrs)     Types: Cigarettes     Start date: 1981     Quit date: 2011     Years since quittin.7    Smokeless tobacco: Former     Types: Chew     Quit date: 2021    Tobacco comments:     8 years without nicotine and resumed 2020   Vaping Use    Vaping Use: Some days    Substances: Nicotine   Substance and Sexual Activity    Alcohol use: Yes     Comment: occ    Drug use: Yes     Frequency: 7.0 times per week     Types: Inhaled     Comment: marijuana    Sexual activity: Not Currently       FAMILY HISTORY  Family History   Problem Relation Age of Onset    Diabetes Father         type 1    Hypertension Father     Hyperlipidemia Father     Diabetes Brother         type 1    Hypertension Brother     Hyperlipidemia Brother     Diabetes Paternal Grandmother         type 1    Heart Disease Paternal Grandmother     Hypertension Paternal Grandmother     Hyperlipidemia Paternal Grandmother     Cancer Neg Hx     Stroke Neg Hx           Objective:   PHYSICAL EXAM  VITAL SIGNS: /70 (BP Location: Right arm, Patient Position: Sitting, BP Cuff Size: Adult)   Pulse (!) 103   Temp 36.7 °C (98.1 °F) (Temporal)   Resp 18   Ht 1.702 m (5' 7\")   Wt 75.8 kg (167 lb)   SpO2 98%   BMI 26.16 kg/m²     Gen: no acute distress, normal voice  Skin: dry, intact, moist mucosal membranes  Eyes: No conjunctival injection b/l  Neck: Normal range of motion. No meningeal signs.   ENT: No oropharyngeal erythema or exudates. Uvula midline. No lymphadenopathy.  Lungs: No increased work of breathing.  CTAB w/ symmetric expansion  CV: Sinus tachycardia w/o murmurs or clicks  Psych: normal affect, normal judgement, alert, awake    Assessment/Plan:     1. Acute bacterial rhinosinusitis  amoxicillin-clavulanate (AUGMENTIN) 875-125 MG Tab      2. Sinus tachycardia        1) " history consistent with a bacterial sinus infection.  Also component of allergies.  Patient was otherwise well-appearing and nontoxic.  -Ordered Augmentin  -Recommended nasal irrigation  -Continue daily Zyrtec and Flonase  -Provided a note for work  -Recommended albuterol for cough  -Return to urgent care any new/worsening symptoms or further questions or concerns.  Patient understood everything discussed.  All questions were answered.    2) systemic inflammatory response secondary to above.      Differential diagnosis and supportive care discussed. Follow-up as needed if symptoms worsen or fail to improve to PCP, Urgent care or Emergency Room.    Please note that this dictation was created using voice recognition software. I have made a reasonable attempt to correct obvious errors, but I expect that there are errors of grammar and possibly content that I did not discover before finalizing the note.

## 2024-05-08 NOTE — LETTER
May 8, 2024       Patient: Thomas Drake   YOB: 1970   Date of Visit: 5/8/2024         To Whom It May Concern:    Thomas Drake is excused from work Tuesday - Thursday.     If you have any questions or concerns, please don't hesitate to call [phone]          Sincerely,          Bruno Russell D.O.  Electronically Signed

## 2024-05-09 ENCOUNTER — TELEPHONE (OUTPATIENT)
Dept: HEALTH INFORMATION MANAGEMENT | Facility: OTHER | Age: 54
End: 2024-05-09

## 2024-05-09 NOTE — TELEPHONE ENCOUNTER
Outcome: Left Message    Please transfer to Patient Outreach Team at [phone] when patient returns call.      Attempt # 1

## 2024-05-16 NOTE — TELEPHONE ENCOUNTER
Outcome: Left Message     Please transfer to Patient Outreach Team at [phone] when patient returns call.        Attempt # 2

## 2024-07-16 DIAGNOSIS — E11.69 TYPE 2 DIABETES MELLITUS WITH OTHER SPECIFIED COMPLICATION, UNSPECIFIED WHETHER LONG TERM INSULIN USE (HCC): Chronic | ICD-10-CM

## 2024-07-18 ENCOUNTER — TELEPHONE (OUTPATIENT)
Dept: HEALTH INFORMATION MANAGEMENT | Facility: OTHER | Age: 54
End: 2024-07-18
Payer: COMMERCIAL

## 2024-11-15 DIAGNOSIS — E11.9 TYPE 2 DIABETES MELLITUS WITHOUT COMPLICATION, WITHOUT LONG-TERM CURRENT USE OF INSULIN (HCC): ICD-10-CM

## 2024-11-15 NOTE — TELEPHONE ENCOUNTER
Received request via: Pharmacy    Was the patient seen in the last year in this department? Yes    Does the patient have an active prescription (recently filled or refills available) for medication(s) requested? No    Pharmacy Name: [id number] - [place], [place] - [address] AT [facility]     Does the patient have correction Plus and need 100-day supply? (This applies to ALL medications) Patient does not have SCP     Requested Prescriptions     Pending Prescriptions Disp Refills    metformin (GLUCOPHAGE) 1000 MG tablet [Pharmacy Med Name: METFORMIN 1000MG TABLETS] 90 Tablet 3     Sig: TAKE 1 TABLET BY MOUTH TWICE DAILY WITH MEALS

## 2024-12-14 ENCOUNTER — OFFICE VISIT (OUTPATIENT)
Dept: URGENT CARE | Facility: PHYSICIAN GROUP | Age: 54
End: 2024-12-14
Payer: COMMERCIAL

## 2024-12-14 VITALS
HEART RATE: 85 BPM | RESPIRATION RATE: 20 BRPM | OXYGEN SATURATION: 95 % | HEIGHT: 68 IN | WEIGHT: 183 LBS | BODY MASS INDEX: 27.74 KG/M2 | TEMPERATURE: 98.4 F | SYSTOLIC BLOOD PRESSURE: 124 MMHG | DIASTOLIC BLOOD PRESSURE: 82 MMHG

## 2024-12-14 DIAGNOSIS — R68.89 FLU-LIKE SYMPTOMS: ICD-10-CM

## 2024-12-14 DIAGNOSIS — B34.9 VIRAL ILLNESS: ICD-10-CM

## 2024-12-14 PROCEDURE — 99213 OFFICE O/P EST LOW 20 MIN: CPT | Performed by: PHYSICIAN ASSISTANT

## 2024-12-14 PROCEDURE — 3079F DIAST BP 80-89 MM HG: CPT | Performed by: PHYSICIAN ASSISTANT

## 2024-12-14 PROCEDURE — 3074F SYST BP LT 130 MM HG: CPT | Performed by: PHYSICIAN ASSISTANT

## 2024-12-14 ASSESSMENT — ENCOUNTER SYMPTOMS
VOMITING: 0
SORE THROAT: 1
NAUSEA: 0
EYE DISCHARGE: 0
EYE REDNESS: 0
DIARRHEA: 0
HEADACHES: 1
COUGH: 1
SHORTNESS OF BREATH: 1
MYALGIAS: 1
FEVER: 1

## 2024-12-14 NOTE — PROGRESS NOTES
Subjective     Tobias Drake is a 54 y.o. male who presents with Flu Like Symptoms (3 days)            URI   This is a new problem. Episode onset: x 3 days ago. The problem has been unchanged. The maximum temperature recorded prior to his arrival was 101 - 101.9 F (The patient reports an associate fever at the onset of symptoms.  The patient states his ibuprofen last night.). Associated symptoms include congestion, coughing, headaches and a sore throat (now improved). Pertinent negatives include no chest pain, diarrhea, ear pain, nausea or vomiting. He has tried acetaminophen for the symptoms.     PMH:  has a past medical history of Asthma, Diabetes ([facility]), Essential hypertension, benign (9/22/2008), Extrinsic asthma (9/30/2008), Hyperlipidemia, and Hypertension.  MEDS:   Current Outpatient Medications:     metformin (GLUCOPHAGE) 1000 MG tablet, TAKE 1 TABLET BY MOUTH TWICE DAILY WITH MEALS, Disp: 90 Tablet, Rfl: 3    glimepiride (AMARYL) 2 MG Tab, Take 1 Tablet by mouth every morning., Disp: 90 Tablet, Rfl: 2    omeprazole (PRILOSEC) 20 MG delayed-release capsule, Take 20 mg by mouth every day., Disp: , Rfl:   ALLERGIES:   Allergies   Allergen Reactions    Amoxicillin Swelling     Joint aches, swelling    Paroxetine Unspecified     Non specific intolerance.    Atorvastatin Calcium Unspecified     myalgias     SURGHX:   Past Surgical History:   Procedure Laterality Date    OTHER ORTHOPEDIC SURGERY      L4-L5 lumbar discectomy    OTHER ORTHOPEDIC SURGERY Left     foot repair without hardware placement     SOCHX:  reports that he quit smoking about 13 years ago. His smoking use included cigarettes. He started smoking about 43 years ago. He has a 30 pack-year smoking history. He quit smokeless tobacco use about 3 years ago.  His smokeless tobacco use included chew. He reports current alcohol use. He reports current drug use. Frequency: 7.00 times per week. Drug: Inhaled.  FH: Family history was reviewed, no pertinent  "findings to report      Review of Systems   Constitutional:  Positive for fever (The patient reports an associated fever at the onset of symptoms, now improved/resolved.).   HENT:  Positive for congestion and sore throat (now improved). Negative for ear pain.    Eyes:  Negative for discharge and redness.   Respiratory:  Positive for cough and shortness of breath (The patient reports intermittent shortness of breath, which is made worse by coughing and exertion.).    Cardiovascular:  Negative for chest pain.   Gastrointestinal:  Negative for diarrhea, nausea and vomiting.   Musculoskeletal:  Positive for myalgias.   Neurological:  Positive for headaches.              Objective     /82   Pulse 85   Temp 36.9 °C (98.4 °F) (Temporal)   Resp 20   Ht 1.727 m (5' 8\")   Wt 83 kg (183 lb)   SpO2 95%   BMI 27.83 kg/m²      Physical Exam  Constitutional:       General: He is not in acute distress.     Appearance: Normal appearance. He is not ill-appearing.   HENT:      Head: Normocephalic and atraumatic.      Right Ear: Tympanic membrane, ear canal and external ear normal.      Left Ear: Tympanic membrane, ear canal and external ear normal.      Nose: Nose normal.      Mouth/Throat:      Mouth: Mucous membranes are moist.      Pharynx: Oropharynx is clear. No posterior oropharyngeal erythema.   Eyes:      Extraocular Movements: Extraocular movements intact.      Conjunctiva/sclera: Conjunctivae normal.   Cardiovascular:      Rate and Rhythm: Normal rate and regular rhythm.      Heart sounds: Normal heart sounds.   Pulmonary:      Effort: Pulmonary effort is normal. No respiratory distress.      Breath sounds: Normal breath sounds. No wheezing.   Musculoskeletal:         General: Normal range of motion.      Cervical back: Normal range of motion and neck supple.   Skin:     General: Skin is warm and dry.   Neurological:      Mental Status: He is alert and oriented to person, place, and time.                       "       Assessment & Plan        Assessment & Plan  Flu-like symptoms         Viral illness                 The patient's presenting symptoms and physical findings are consistent with flulike symptoms likely secondary to a viral illness.  The patient's physical exam today in clinic was normal.  The patient is nontoxic and appears in no acute distress.  The patient's vital signs are stable and within normal limits.  He is afebrile today in clinic.  Discussed likely viral etiology with the patient.  The patient is requesting a note for work at this time.  Advised the patient to monitor worsening signs or symptoms.  Recommend OTC medications and supportive care for symptomatic management.  Recommend patient follow-up with primary care as needed.  Discussed return precautions with the patient, and he verbalized understanding.    Differential diagnoses, supportive care, and indications for immediate follow-up discussed with patient.   Instructed to return to clinic or nearest emergency department for any change in condition, further concerns, or worsening of symptoms.    OTC Tylenol or Motrin for fever/discomfort.  OTC cough/cold medication for symptomatic relief  OTC Supportive Care for Congestion - saline nasal spray or neti pot  Drink plenty of fluids  Follow-up with PCP  Return to clinic or go to the ED if symptoms worsen or fail to improve, or if the patient should develop worsening/increasing cough, congestion, ear pain, sore throat, shortness of breath, wheezing, chest pain, fever/chills, and/or any concerning symptoms.    Discussed plan with the patient, and he agrees to the above.    I personally reviewed prior external notes and test results pertinent to today's visit.  I have independently reviewed and interpreted all diagnostics ordered during this urgent care visit.     Please note that this dictation was created using voice recognition software. I have made every reasonable attempt to correct obvious errors,  but I expect that there may be errors of grammar and possibly content that I did not discover before finalizing the note.     This note was electronically signed by Vicky Torrez PA-C

## 2024-12-14 NOTE — LETTER
December 14, 2024         Patient: Thomas Drake   YOB: 1970   Date of Visit: 12/14/2024           To Whom it May Concern:    Thomas Drake was seen in my clinic on 12/14/2024. Please excuse him from work 12/12, 12/13, and 12/14. He may return to work on 12/16/2024.    If you have any questions or concerns, please don't hesitate to call.        Sincerely,           Vicky Torrez P.A.-C.  Electronically Signed

## 2025-02-05 DIAGNOSIS — E11.9 TYPE 2 DIABETES MELLITUS WITHOUT COMPLICATION, WITHOUT LONG-TERM CURRENT USE OF INSULIN (HCC): ICD-10-CM

## 2025-02-05 RX ORDER — GLIMEPIRIDE 2 MG/1
2 TABLET ORAL EVERY MORNING
Qty: 90 TABLET | Refills: 2 | Status: SHIPPED | OUTPATIENT
Start: 2025-02-05